# Patient Record
Sex: MALE | Race: WHITE | NOT HISPANIC OR LATINO | Employment: FULL TIME | URBAN - METROPOLITAN AREA
[De-identification: names, ages, dates, MRNs, and addresses within clinical notes are randomized per-mention and may not be internally consistent; named-entity substitution may affect disease eponyms.]

---

## 2019-12-05 ENCOUNTER — TRANSCRIBE ORDERS (OUTPATIENT)
Dept: ADMINISTRATIVE | Facility: HOSPITAL | Age: 34
End: 2019-12-05

## 2019-12-05 DIAGNOSIS — G54.0 THORACIC OUTLET SYNDROME: Primary | ICD-10-CM

## 2019-12-09 ENCOUNTER — HOSPITAL ENCOUNTER (OUTPATIENT)
Dept: CARDIOLOGY | Facility: HOSPITAL | Age: 34
Discharge: HOME | End: 2019-12-09
Admitting: SURGERY

## 2019-12-09 DIAGNOSIS — G54.0 THORACIC OUTLET SYNDROME: ICD-10-CM

## 2019-12-09 LAB
BH CV UPPER ARTERIAL LEFT 2ND DIGIT SYS MAX: 130 MMHG
BH CV UPPER ARTERIAL LEFT FBI 2ND DIGIT RATIO: 1.1
BH CV UPPER ARTERIAL RIGHT 2ND DIGIT SYS MAX: 132 MMHG
BH CV UPPER ARTERIAL RIGHT FBI 2ND DIGIT RATIO: 1.1
BH CV UPPER ARTERIAL RIGHT WBI RATIO: 1
UPPER ARTERIAL RIGHT ARM BRACHIAL SYS MAX: 117 MMHG
UPPER ARTERIAL RIGHT ARM RADIAL SYS MAX: 119 MMHG
UPPER ARTERIAL RIGHT ARM ULNAR SYS MAX: 114 MMHG

## 2019-12-09 PROCEDURE — 93923 UPR/LXTR ART STDY 3+ LVLS: CPT

## 2019-12-17 RX ORDER — PROPRANOLOL HYDROCHLORIDE 10 MG/1
10 TABLET ORAL DAILY PRN
COMMUNITY
Start: 2019-12-09

## 2019-12-17 RX ORDER — TRAZODONE HYDROCHLORIDE 50 MG/1
50 TABLET ORAL NIGHTLY PRN
COMMUNITY
Start: 2019-12-09 | End: 2020-12-08

## 2019-12-18 ENCOUNTER — HOSPITAL ENCOUNTER (OUTPATIENT)
Facility: HOSPITAL | Age: 34
Setting detail: HOSPITAL OUTPATIENT SURGERY
Discharge: HOME | End: 2019-12-18
Attending: SURGERY | Admitting: SURGERY

## 2019-12-18 ENCOUNTER — ANESTHESIA EVENT (OUTPATIENT)
Dept: PERIOP | Facility: HOSPITAL | Age: 34
End: 2019-12-18

## 2019-12-18 ENCOUNTER — ANESTHESIA (OUTPATIENT)
Dept: PERIOP | Facility: HOSPITAL | Age: 34
End: 2019-12-18

## 2019-12-18 ENCOUNTER — APPOINTMENT (OUTPATIENT)
Dept: GENERAL RADIOLOGY | Facility: HOSPITAL | Age: 34
End: 2019-12-18

## 2019-12-18 VITALS
DIASTOLIC BLOOD PRESSURE: 77 MMHG | HEIGHT: 76 IN | HEART RATE: 67 BPM | RESPIRATION RATE: 16 BRPM | WEIGHT: 183.86 LBS | SYSTOLIC BLOOD PRESSURE: 117 MMHG | TEMPERATURE: 97.8 F | BODY MASS INDEX: 22.39 KG/M2 | OXYGEN SATURATION: 99 %

## 2019-12-18 PROBLEM — I87.1 VENOUS THORACIC OUTLET SYNDROME OF LEFT SUBCLAVIAN VEIN: Status: ACTIVE | Noted: 2019-12-18

## 2019-12-18 PROCEDURE — C1725 CATH, TRANSLUMIN NON-LASER: HCPCS | Performed by: SURGERY

## 2019-12-18 PROCEDURE — C1887 CATHETER, GUIDING: HCPCS | Performed by: SURGERY

## 2019-12-18 PROCEDURE — C1894 INTRO/SHEATH, NON-LASER: HCPCS | Performed by: SURGERY

## 2019-12-18 PROCEDURE — 0 IOPAMIDOL PER 1 ML: Performed by: SURGERY

## 2019-12-18 PROCEDURE — C1769 GUIDE WIRE: HCPCS | Performed by: SURGERY

## 2019-12-18 PROCEDURE — 25010000002 ONDANSETRON PER 1 MG: Performed by: NURSE ANESTHETIST, CERTIFIED REGISTERED

## 2019-12-18 PROCEDURE — 25010000003 CEFAZOLIN IN DEXTROSE 2-4 GM/100ML-% SOLUTION: Performed by: SURGERY

## 2019-12-18 PROCEDURE — 75820 VEIN X-RAY ARM/LEG: CPT

## 2019-12-18 PROCEDURE — 25010000002 FENTANYL CITRATE (PF) 100 MCG/2ML SOLUTION: Performed by: NURSE ANESTHETIST, CERTIFIED REGISTERED

## 2019-12-18 PROCEDURE — 25010000002 ALTEPLASE 2 MG RECONSTITUTED SOLUTION 1 EACH VIAL: Performed by: SURGERY

## 2019-12-18 PROCEDURE — 25010000002 PROPOFOL 10 MG/ML EMULSION: Performed by: NURSE ANESTHETIST, CERTIFIED REGISTERED

## 2019-12-18 PROCEDURE — 25010000002 DEXAMETHASONE PER 1 MG: Performed by: NURSE ANESTHETIST, CERTIFIED REGISTERED

## 2019-12-18 PROCEDURE — 25010000003 LIDOCAINE 1 % SOLUTION 20 ML VIAL: Performed by: SURGERY

## 2019-12-18 PROCEDURE — C1757 CATH, THROMBECTOMY/EMBOLECT: HCPCS | Performed by: SURGERY

## 2019-12-18 PROCEDURE — 25010000002 HEPARIN (PORCINE) PER 1000 UNITS: Performed by: SURGERY

## 2019-12-18 PROCEDURE — 25010000002 MIDAZOLAM PER 1 MG: Performed by: ANESTHESIOLOGY

## 2019-12-18 RX ORDER — DIPHENHYDRAMINE HYDROCHLORIDE 50 MG/ML
12.5 INJECTION INTRAMUSCULAR; INTRAVENOUS
Status: DISCONTINUED | OUTPATIENT
Start: 2019-12-18 | End: 2019-12-18 | Stop reason: HOSPADM

## 2019-12-18 RX ORDER — ONDANSETRON 2 MG/ML
4 INJECTION INTRAMUSCULAR; INTRAVENOUS ONCE AS NEEDED
Status: DISCONTINUED | OUTPATIENT
Start: 2019-12-18 | End: 2019-12-18 | Stop reason: HOSPADM

## 2019-12-18 RX ORDER — FLUMAZENIL 0.1 MG/ML
0.2 INJECTION INTRAVENOUS AS NEEDED
Status: DISCONTINUED | OUTPATIENT
Start: 2019-12-18 | End: 2019-12-18 | Stop reason: HOSPADM

## 2019-12-18 RX ORDER — HYDRALAZINE HYDROCHLORIDE 20 MG/ML
5 INJECTION INTRAMUSCULAR; INTRAVENOUS
Status: DISCONTINUED | OUTPATIENT
Start: 2019-12-18 | End: 2019-12-18 | Stop reason: HOSPADM

## 2019-12-18 RX ORDER — PROMETHAZINE HYDROCHLORIDE 25 MG/ML
6.25 INJECTION, SOLUTION INTRAMUSCULAR; INTRAVENOUS
Status: DISCONTINUED | OUTPATIENT
Start: 2019-12-18 | End: 2019-12-18 | Stop reason: HOSPADM

## 2019-12-18 RX ORDER — CEFAZOLIN SODIUM 2 G/100ML
2 INJECTION, SOLUTION INTRAVENOUS ONCE
Status: COMPLETED | OUTPATIENT
Start: 2019-12-18 | End: 2019-12-18

## 2019-12-18 RX ORDER — DEXAMETHASONE SODIUM PHOSPHATE 10 MG/ML
INJECTION INTRAMUSCULAR; INTRAVENOUS AS NEEDED
Status: DISCONTINUED | OUTPATIENT
Start: 2019-12-18 | End: 2019-12-18 | Stop reason: SURG

## 2019-12-18 RX ORDER — HYDROCODONE BITARTRATE AND ACETAMINOPHEN 7.5; 325 MG/1; MG/1
1 TABLET ORAL ONCE AS NEEDED
Status: DISCONTINUED | OUTPATIENT
Start: 2019-12-18 | End: 2019-12-18 | Stop reason: HOSPADM

## 2019-12-18 RX ORDER — NALOXONE HCL 0.4 MG/ML
0.2 VIAL (ML) INJECTION AS NEEDED
Status: DISCONTINUED | OUTPATIENT
Start: 2019-12-18 | End: 2019-12-18 | Stop reason: HOSPADM

## 2019-12-18 RX ORDER — MIDAZOLAM HYDROCHLORIDE 1 MG/ML
2 INJECTION INTRAMUSCULAR; INTRAVENOUS
Status: DISCONTINUED | OUTPATIENT
Start: 2019-12-18 | End: 2019-12-18 | Stop reason: HOSPADM

## 2019-12-18 RX ORDER — PROMETHAZINE HYDROCHLORIDE 25 MG/ML
12.5 INJECTION, SOLUTION INTRAMUSCULAR; INTRAVENOUS ONCE AS NEEDED
Status: DISCONTINUED | OUTPATIENT
Start: 2019-12-18 | End: 2019-12-18 | Stop reason: HOSPADM

## 2019-12-18 RX ORDER — PROPOFOL 10 MG/ML
VIAL (ML) INTRAVENOUS AS NEEDED
Status: DISCONTINUED | OUTPATIENT
Start: 2019-12-18 | End: 2019-12-18 | Stop reason: SURG

## 2019-12-18 RX ORDER — ONDANSETRON 2 MG/ML
INJECTION INTRAMUSCULAR; INTRAVENOUS AS NEEDED
Status: DISCONTINUED | OUTPATIENT
Start: 2019-12-18 | End: 2019-12-18 | Stop reason: SURG

## 2019-12-18 RX ORDER — FENTANYL CITRATE 50 UG/ML
50 INJECTION, SOLUTION INTRAMUSCULAR; INTRAVENOUS
Status: DISCONTINUED | OUTPATIENT
Start: 2019-12-18 | End: 2019-12-18 | Stop reason: HOSPADM

## 2019-12-18 RX ORDER — LIDOCAINE HYDROCHLORIDE 20 MG/ML
INJECTION, SOLUTION INFILTRATION; PERINEURAL AS NEEDED
Status: DISCONTINUED | OUTPATIENT
Start: 2019-12-18 | End: 2019-12-18 | Stop reason: SURG

## 2019-12-18 RX ORDER — LABETALOL HYDROCHLORIDE 5 MG/ML
5 INJECTION, SOLUTION INTRAVENOUS
Status: DISCONTINUED | OUTPATIENT
Start: 2019-12-18 | End: 2019-12-18 | Stop reason: HOSPADM

## 2019-12-18 RX ORDER — MIDAZOLAM HYDROCHLORIDE 1 MG/ML
1 INJECTION INTRAMUSCULAR; INTRAVENOUS
Status: DISCONTINUED | OUTPATIENT
Start: 2019-12-18 | End: 2019-12-18 | Stop reason: HOSPADM

## 2019-12-18 RX ORDER — PROMETHAZINE HYDROCHLORIDE 25 MG/1
25 TABLET ORAL ONCE AS NEEDED
Status: DISCONTINUED | OUTPATIENT
Start: 2019-12-18 | End: 2019-12-18 | Stop reason: HOSPADM

## 2019-12-18 RX ORDER — EPHEDRINE SULFATE 50 MG/ML
5 INJECTION, SOLUTION INTRAVENOUS ONCE AS NEEDED
Status: DISCONTINUED | OUTPATIENT
Start: 2019-12-18 | End: 2019-12-18 | Stop reason: HOSPADM

## 2019-12-18 RX ORDER — HYDROMORPHONE HYDROCHLORIDE 1 MG/ML
0.5 INJECTION, SOLUTION INTRAMUSCULAR; INTRAVENOUS; SUBCUTANEOUS
Status: DISCONTINUED | OUTPATIENT
Start: 2019-12-18 | End: 2019-12-18 | Stop reason: HOSPADM

## 2019-12-18 RX ORDER — SODIUM CHLORIDE 0.9 % (FLUSH) 0.9 %
3-10 SYRINGE (ML) INJECTION AS NEEDED
Status: DISCONTINUED | OUTPATIENT
Start: 2019-12-18 | End: 2019-12-18 | Stop reason: HOSPADM

## 2019-12-18 RX ORDER — PROMETHAZINE HYDROCHLORIDE 25 MG/1
25 SUPPOSITORY RECTAL ONCE AS NEEDED
Status: DISCONTINUED | OUTPATIENT
Start: 2019-12-18 | End: 2019-12-18 | Stop reason: HOSPADM

## 2019-12-18 RX ORDER — ACETAMINOPHEN 325 MG/1
650 TABLET ORAL ONCE AS NEEDED
Status: DISCONTINUED | OUTPATIENT
Start: 2019-12-18 | End: 2019-12-18 | Stop reason: HOSPADM

## 2019-12-18 RX ORDER — OXYCODONE AND ACETAMINOPHEN 7.5; 325 MG/1; MG/1
1 TABLET ORAL ONCE AS NEEDED
Status: DISCONTINUED | OUTPATIENT
Start: 2019-12-18 | End: 2019-12-18 | Stop reason: HOSPADM

## 2019-12-18 RX ORDER — SODIUM CHLORIDE 0.9 % (FLUSH) 0.9 %
3 SYRINGE (ML) INJECTION EVERY 12 HOURS SCHEDULED
Status: DISCONTINUED | OUTPATIENT
Start: 2019-12-18 | End: 2019-12-18 | Stop reason: HOSPADM

## 2019-12-18 RX ORDER — FAMOTIDINE 10 MG/ML
20 INJECTION, SOLUTION INTRAVENOUS ONCE
Status: COMPLETED | OUTPATIENT
Start: 2019-12-18 | End: 2019-12-18

## 2019-12-18 RX ORDER — SODIUM CHLORIDE, SODIUM LACTATE, POTASSIUM CHLORIDE, CALCIUM CHLORIDE 600; 310; 30; 20 MG/100ML; MG/100ML; MG/100ML; MG/100ML
9 INJECTION, SOLUTION INTRAVENOUS CONTINUOUS
Status: DISCONTINUED | OUTPATIENT
Start: 2019-12-18 | End: 2019-12-18 | Stop reason: HOSPADM

## 2019-12-18 RX ORDER — DIPHENHYDRAMINE HCL 25 MG
25 CAPSULE ORAL
Status: DISCONTINUED | OUTPATIENT
Start: 2019-12-18 | End: 2019-12-18 | Stop reason: HOSPADM

## 2019-12-18 RX ORDER — FENTANYL CITRATE 50 UG/ML
INJECTION, SOLUTION INTRAMUSCULAR; INTRAVENOUS AS NEEDED
Status: DISCONTINUED | OUTPATIENT
Start: 2019-12-18 | End: 2019-12-18 | Stop reason: SURG

## 2019-12-18 RX ADMIN — FENTANYL CITRATE 50 MCG: 50 INJECTION INTRAMUSCULAR; INTRAVENOUS at 12:37

## 2019-12-18 RX ADMIN — PROPOFOL 150 MCG/KG/MIN: 10 INJECTION, EMULSION INTRAVENOUS at 12:37

## 2019-12-18 RX ADMIN — SODIUM CHLORIDE, POTASSIUM CHLORIDE, SODIUM LACTATE AND CALCIUM CHLORIDE 9 ML/HR: 600; 310; 30; 20 INJECTION, SOLUTION INTRAVENOUS at 11:54

## 2019-12-18 RX ADMIN — IOPAMIDOL 30 ML: 510 INJECTION, SOLUTION INTRAVASCULAR at 12:32

## 2019-12-18 RX ADMIN — FAMOTIDINE 20 MG: 10 INJECTION INTRAVENOUS at 11:54

## 2019-12-18 RX ADMIN — CEFAZOLIN SODIUM 2 G: 2 INJECTION, SOLUTION INTRAVENOUS at 12:33

## 2019-12-18 RX ADMIN — LIDOCAINE HYDROCHLORIDE 80 MG: 20 INJECTION, SOLUTION INFILTRATION; PERINEURAL at 12:33

## 2019-12-18 RX ADMIN — MIDAZOLAM 2 MG: 1 INJECTION INTRAMUSCULAR; INTRAVENOUS at 11:54

## 2019-12-18 RX ADMIN — PROPOFOL 50 MG: 10 INJECTION, EMULSION INTRAVENOUS at 12:37

## 2019-12-18 RX ADMIN — DEXAMETHASONE SODIUM PHOSPHATE 8 MG: 10 INJECTION INTRAMUSCULAR; INTRAVENOUS at 12:43

## 2019-12-18 RX ADMIN — ONDANSETRON HYDROCHLORIDE 4 MG: 2 SOLUTION INTRAMUSCULAR; INTRAVENOUS at 12:43

## 2019-12-18 RX ADMIN — PROPOFOL 50 MG: 10 INJECTION, EMULSION INTRAVENOUS at 12:34

## 2019-12-18 RX ADMIN — FENTANYL CITRATE 50 MCG: 50 INJECTION INTRAMUSCULAR; INTRAVENOUS at 12:30

## 2019-12-18 NOTE — PERIOPERATIVE NURSING NOTE
Belongings placed in lower cabinet in back nurses station in preop.  Locked and logged on the steno book.

## 2019-12-18 NOTE — ANESTHESIA POSTPROCEDURE EVALUATION
"Patient: Rad Baltazar    Procedure Summary     Date:  12/18/19 Room / Location:   [facility] INV /  [facility] 18/19    Anesthesia Start:  1228 Anesthesia Stop:  1340    Procedure:  LEFT ARM VENOGRAM AND THROMOLYSIS (Left ) Diagnosis:      Surgeon:  Dejuan White MD Provider:  Braulio Andrade MD    Anesthesia Type:  MAC ASA Status:  2          Anesthesia Type: MAC    Vitals  Vitals Value Taken Time   /83 12/18/2019  2:25 PM   Temp 36.6 °C (97.8 °F) 12/18/2019  1:37 PM   Pulse 78 12/18/2019  2:10 PM   Resp 16 12/18/2019  2:25 PM   SpO2 98 % 12/18/2019  2:37 PM   Vitals shown include unvalidated device data.        Post Anesthesia Care and Evaluation    Patient location during evaluation: bedside  Patient participation: complete - patient participated  Level of consciousness: awake  Pain management: adequate  Airway patency: patent  Anesthetic complications: No anesthetic complications    Cardiovascular status: acceptable  Respiratory status: acceptable  Hydration status: acceptable    Comments: */83   Pulse 78   Temp 36.6 °C (97.8 °F) (Oral)   Resp 16   Ht 193 cm (76\")   Wt 83.4 kg (183 lb 13.8 oz)   SpO2 98%   BMI 22.38 kg/m²         "

## 2019-12-18 NOTE — DISCHARGE INSTRUCTIONS
NO STICKS IN THE LEFT ARM, BUT A BLOOD PRESSURE IS OK          Moderate Conscious Sedation, Adult, Care After  Refer to this sheet in the next few weeks. These instructions provide you with information on caring for yourself after your procedure. Your health care provider may also give you more specific instructions. Your treatment has been planned according to current medical practices, but problems sometimes occur. Call your health care provider if you have any problems or questions after your procedure.  WHAT TO EXPECT AFTER THE PROCEDURE   After your procedure:  · You may feel sleepy, clumsy, and have poor balance for several hours.  · Vomiting may occur if you eat too soon after the procedure.  HOME CARE INSTRUCTIONS  · Do not participate in any activities where you could become injured for at least 24 hours. Do not:    Drive.    Swim.    Ride a bicycle.    Operate heavy machinery.    Cook.    Use power tools.    Climb ladders.    Work from a high place.  · Do not make important decisions or sign legal documents until you are improved.  · If you vomit, drink water, juice, or soup when you can drink without vomiting. Make sure you have little or no nausea before eating solid foods.  · Only take over-the-counter or prescription medicines for pain, discomfort, or fever as directed by your health care provider.  · Make sure you and your family fully understand everything about the medicines given to you, including what side effects may occur.  · You should not drink alcohol, take sleeping pills, or take medicines that cause drowsiness for at least 24 hours.  · If you smoke, do not smoke without supervision.  · If you are feeling better, you may resume normal activities 24 hours after you were sedated.  · Keep all appointments with your health care provider.  · You should have a responsible adult stay with you for the first 24 hours post procedure.  SEEK MEDICAL CARE IF:  · Your skin is pale or bluish in  color.  · You continue to feel nauseous or vomit.  · Your pain is getting worse and is not helped by medicine.  · You have bleeding or swelling.  · You are still sleepy or feeling clumsy after 24 hours.  SEEK IMMEDIATE MEDICAL CARE IF:  · You develop a rash.  · You have difficulty breathing.  · You develop any type of allergic problem.  · You have a fever.  MAKE SURE YOU:  · Understand these instructions.  · Will watch your condition.  · Will get help right away if you are not doing well or get worse.     This information is not intended to replace advice given to you by your health care provider. Make sure you discuss any questions you have with your health care provider.     Document Released: 10/08/2014 Document Revised: 01/08/2016 Document Reviewed: 10/08/2014  Elsevier Interactive Patient Education ©2016 [employer]

## 2019-12-18 NOTE — OP NOTE
[facility]  Rad Baltazar  1985     Brief Operative Note    12/18/19   Dejuan White MD      Preoperative Diagnosis: Paget Taylor syndrome, left arm    Postoperative Diagnosis: same as above    Procedure Performed: #1 ultrasound-guided access left basilic vein, #2 left arm venogram with angioplasty.  #3 thrombolysis of left subclavian vein    Surgeon: Dejuan White MD    Assistant: Alexa Gotti and they provided critical assistance during the case including suctioning, exposure, retraction, and reduction of blood loss.    Anesthesia: MAC and Local Anesthesia with 1% Xylocaine with Epinephrine 1:100,000    Estimated Blood Loss: minimal    Specimen: None    Complications: None    Dispo: aroused from sedation, and taken to the recovery room in a stable condition    Indication for procedure: 34 y.o. male with subacute thrombus of the left axilla subclavian vein.  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  Chronic thrombus in the axillary vein.  Reconstitution of small flow channel seen after thrombolysis and balloon angioplasty    Description of procedure: The patient was taken to the operating room and placed in the supine position.  The left arm was prepped and draped in usual sterile fashion.  A timeout was observed.  Preoperative antibiotics were given.  Using ultrasound guidance and local anesthesia the left basilic vein was accessed in a standard fashion and a micropuncture sheath was placed.  This was upsized to a 6 Mauritian sheath.  A left arm venogram was performed.  With some difficulty, the wire was manipulated into the superior vena cava.  This required abduction of the patient's arm to facilitate wire placement.  A as a line today AngioJet was placed and TPA was instilled into the axillary and subclavian clot and allowed to dwell for 10 minutes.  Then, the AngioJet was  used for 110 pulses to perform a thrombectomy.  Repeat angiography showed no significant change.  The thoracic outlet was balloon angioplastied with a 6 mm x 8 cm Ripton balloon.  Significant waist was seen at the thoracic outlet.  This was resolved.  Further venograms revealed a small channel at the thoracic outlet.  No further angioplasty was done.  The sheaths, wire, catheter were discontinued.  The patient tolerated the procedure well.                      Dejuan White MD  12/18/19

## 2019-12-18 NOTE — H&P
Name: Rad Baltazar ADMIT: 2019   : 1985  PCP: Dar Garcia MD    MRN: [id number] LOS: 0 days   AGE/SEX: 34 y.o. male  ROOM: [facility] OR  [facility]         CHIEF COMPLAINT: Left arm swelling  HPI: Rad Baltazar is a 34 y.o. male whose previous medical records I have reviewed and summarize below in addition to the findings from today's exam.  He has a left axilla subclavian DVT times about 6 weeks.  This began without inciting event.  He was initially treated with aspirin but is now on Eliquis.  He is here today for left arm venogram and possible thrombolysis of subclavian   Vein thrombus  Continues to notice left arm swelling and collateral vein formation  PAST MEDICAL HISTORY:   Past Medical History:   Diagnosis Date   • DVT (deep venous thrombosis) (CMS/HCC)     LEFT ARM   • Thoracic outlet syndrome       PAST SURGICAL HISTORY:   Past Surgical History:   Procedure Laterality Date   • WISDOM TOOTH EXTRACTION        FAMILY HISTORY:   Family History   Problem Relation Age of Onset   • Malig Hyperthermia Neg Hx       SOCIAL HISTORY:   Social History     Tobacco Use   • Smoking status: Current Every Day Smoker   • Smokeless tobacco: Never Used   • Tobacco comment: 8 CIGS A DAY   Substance Use Topics   • Alcohol use: Never     Frequency: Never   • Drug use: Never      MEDICATIONS:   No current facility-administered medications on file prior to encounter.      Current Outpatient Medications on File Prior to Encounter   Medication Sig Dispense Refill   • apixaban (ELIQUIS) 5 MG tablet tablet Take 5 mg by mouth 2 (Two) Times a Day.     • Multiple Vitamins-Minerals (MULTIVITAMIN ADULT PO) Take 1 tablet by mouth Daily.     • propranolol (INDERAL) 10 MG tablet Take 10 mg by mouth Daily.     • traZODone (DESYREL) 50 MG tablet Take 50 mg by mouth As Needed.               ALLERGIES: Patient has no known allergies.     COMPLETE REVIEW OF SYSTEMS:     Review of Systems   Constitutional:  "Positive for activity change and fatigue.   Respiratory: Negative for shortness of breath.    Cardiovascular: Negative for chest pain.   Gastrointestinal: Negative for abdominal pain.         PHYSICAL EXAM:   Patient Vitals for the past 24 hrs:   BP Temp Temp src Pulse Resp SpO2 Height Weight   12/18/19 1121 129/80 97.8 °F (36.6 °C) Oral 77 18 100 % 193 cm (76\") 83.4 kg (183 lb 13.8 oz)        Physical Exam   Constitutional: He appears well-developed. No distress.   HENT:   Head: Normocephalic and atraumatic.   Cardiovascular: Normal rate and regular rhythm.   Pulmonary/Chest: Effort normal. No respiratory distress.   Abdominal: Soft. There is no tenderness.   Neurological: He is alert.   Skin: Skin is warm and dry.     Significant left upper extremity swelling without cyanosis      LABS:      No results found for this or any previous visit (from the past 24 hour(s)).]    The following radiologic or non-invasive studies including the images have been independently reviewed by me and my impressions are as follows: Ultrasound with axilla subclavian DVT on the left       ASSESSMENT/PLAN: 34 y.o. male with probable Paget Taylor syndrome with the left axilla subclavian DVT in the setting of healthy muscular young male.  He is here today for thrombolyzes to establish a channel to allow for drainage.  We discussed that he needs to abstain from vigorous upper extremity exercise to allow his vein the greatest opportunity to recanalize without re-traumatizing it.  We discussed that he may eventually require rib resection and scalene ectomy.    I have discussed with the patient the following five points:  1-  I have been asked to see Rad Baltazar for the treatment of the diagnosis of: Left arm DVT with swelling  2- The planned treatment of this diagnosis is: Thrombolysis, venogram  3- The risks of a procedure include but are not limited to the following: bleeding, thrombosis, damage to adjacent anatomical structures " including nerves or blood vessels, infections, wound healing problems, the need for further procedures, whether planned or emergent. The benefits of a procedure include but are not limited to the following: Restoration of normal circulation  4- Alternatives to the planned procedure include: Anticoagulation alone  5- The natural history of the diagnosis if no treatment is given is: Continued swelling    Assessment/Plan       * No active hospital problems. *      Dejuan White MD   12/18/19

## 2020-02-10 ENCOUNTER — APPOINTMENT (OUTPATIENT)
Dept: PREADMISSION TESTING | Facility: HOSPITAL | Age: 35
End: 2020-02-10

## 2020-02-10 VITALS
RESPIRATION RATE: 16 BRPM | DIASTOLIC BLOOD PRESSURE: 77 MMHG | BODY MASS INDEX: 22.1 KG/M2 | HEART RATE: 63 BPM | TEMPERATURE: 98.6 F | SYSTOLIC BLOOD PRESSURE: 123 MMHG | OXYGEN SATURATION: 100 % | HEIGHT: 76 IN | WEIGHT: 181.5 LBS

## 2020-02-10 LAB
ANION GAP SERPL CALCULATED.3IONS-SCNC: 13.4 MMOL/L (ref 5–15)
BUN BLD-MCNC: 23 MG/DL (ref 6–20)
BUN/CREAT SERPL: 21.9 (ref 7–25)
CALCIUM SPEC-SCNC: 9.1 MG/DL (ref 8.6–10.5)
CHLORIDE SERPL-SCNC: 98 MMOL/L (ref 98–107)
CO2 SERPL-SCNC: 27.6 MMOL/L (ref 22–29)
CREAT BLD-MCNC: 1.05 MG/DL (ref 0.76–1.27)
DEPRECATED RDW RBC AUTO: 42.3 FL (ref 37–54)
ERYTHROCYTE [DISTWIDTH] IN BLOOD BY AUTOMATED COUNT: 12.5 % (ref 12.3–15.4)
GFR SERPL CREATININE-BSD FRML MDRD: 81 ML/MIN/1.73
GLUCOSE BLD-MCNC: 96 MG/DL (ref 65–99)
HCT VFR BLD AUTO: 46.1 % (ref 37.5–51)
HGB BLD-MCNC: 16 G/DL (ref 13–17.7)
MCH RBC QN AUTO: 32 PG (ref 26.6–33)
MCHC RBC AUTO-ENTMCNC: 34.7 G/DL (ref 31.5–35.7)
MCV RBC AUTO: 92.2 FL (ref 79–97)
PLATELET # BLD AUTO: 177 10*3/MM3 (ref 140–450)
PMV BLD AUTO: 11 FL (ref 6–12)
POTASSIUM BLD-SCNC: 3.9 MMOL/L (ref 3.5–5.2)
RBC # BLD AUTO: 5 10*6/MM3 (ref 4.14–5.8)
SODIUM BLD-SCNC: 139 MMOL/L (ref 136–145)
WBC NRBC COR # BLD: 8.57 10*3/MM3 (ref 3.4–10.8)

## 2020-02-10 PROCEDURE — 80048 BASIC METABOLIC PNL TOTAL CA: CPT | Performed by: SURGERY

## 2020-02-10 PROCEDURE — 85027 COMPLETE CBC AUTOMATED: CPT | Performed by: SURGERY

## 2020-02-10 PROCEDURE — 36415 COLL VENOUS BLD VENIPUNCTURE: CPT

## 2020-02-10 NOTE — DISCHARGE INSTRUCTIONS
Take the following medications the morning of surgery:  PROPRANOLOL    PLEASE ARRIVE AT THE HOSPITAL AT 7:30 AM ON February 17, 2020    General Instructions:  • Do not eat solid food after midnight the night before surgery.  • You may drink clear liquids day of surgery but must stop at least one hour before your hospital arrival time.  • NOTHING TO DRINK AFTER 6:30 AM  • It is beneficial for you to have a clear drink that contains carbohydrates the day of surgery.  We suggest a 12 to 20 ounce bottle of Gatorade or Powerade for non-diabetic patients or a 12 to 20 ounce bottle of G2 or Powerade Zero for diabetic patients. (Pediatric patients, are not advised to drink a 12 to 20 ounce carbohydrate drink)    Clear liquids are liquids you can see through.  Nothing red in color.     Plain water                               Sports drinks  Sodas                                   Gelatin (Jell-O)  Fruit juices without pulp such as white grape juice and apple juice  Popsicles that contain no fruit or yogurt  Tea or coffee (no cream or milk added)  Gatorade / Powerade  G2 / Powerade Zero    • Infants may have breast milk up to four hours before surgery.  • Infants drinking formula may drink formula up to six hours before surgery.   • Patients who avoid smoking, chewing tobacco and alcohol for 4 weeks prior to surgery have a reduced risk of post-operative complications.  Quit smoking as many days before surgery as you can.  • Do not smoke, use chewing tobacco or drink alcohol the day of surgery.   • If applicable bring your C-PAP/ BI-PAP machine.  • Bring any papers given to you in the doctor’s office.  • Wear clean comfortable clothes.  • Do not wear contact lenses, false eyelashes or make-up.  Bring a case for your glasses.   • Bring crutches or walker if applicable.  • Remove all piercings.  Leave jewelry and any other valuables at home.  • Hair extensions with metal clips must be removed prior to surgery.  • The  Pre-Admission Testing nurse will instruct you to bring medications if unable to obtain an accurate list in Pre-Admission Testing.      Preventing a Surgical Site Infection:  • For 2 to 3 days before surgery, avoid shaving with a razor because the razor can irritate skin and make it easier to develop an infection.    • Any areas of open skin can increase the risk of a post-operative wound infection by allowing bacteria to enter and travel throughout the body.  Notify your surgeon if you have any skin wounds / rashes even if it is not near the expected surgical site.  The area will need assessed to determine if surgery should be delayed until it is healed.  • The night prior to surgery sleep in a clean bed with clean clothing.  Do not allow pets to sleep with you.  • Shower on the morning of surgery using a fresh bar of anti-bacterial soap (such as Dial) and clean washcloth.  Dry with a clean towel and dress in clean clothing.  • Ask your surgeon if you will be receiving antibiotics prior to surgery.  • Make sure you, your family, and all healthcare providers clean their hands with soap and water or an alcohol based hand  before caring for you or your wound.    2% CHLORHEXIDINE GLUCONATE* CLOTH  Preparing or “prepping” skin before surgery can reduce the risk of infection at the surgical site. To make the process easier, [facility] has chosen disposable cloths moistened with a rinse-free, 2% Chlorhexidine Gluconate (CHG) antiseptic solution. The steps below outline the prepping process and should be carefully followed.        Use the prep cloth on the area that is circled in the diagram             Directions Night before Surgery  1) Shower using a fresh bar of anti-bacterial soap (such as Dial) and clean washcloth.  Use a clean towel to completely dry your skin.  2) Do not use any lotions, oils or creams on your skin.  3) Open the package and remove 1 cloth, wipe your skin for 30 seconds in a  circular motion.  Allow to dry for 3 minutes.  4) Repeat #3 with second cloth.  5) Do not touch your eyes, ears, or mouth with the prep cloth.  6) Allow the wet prep solution to air dry.  7) Discard the prep cloth and wash your hands with soap and water.   8) Dress in clean bed clothes and sleep on fresh clean bed sheets.   9) You may experience some temporary itching after the prep.    Directions Day of Surgery  1) Repeat steps 1,2,3,4,5,6,7, and 9.   2) Dress in clean clothes before coming to the hospital.    Day of surgery:  Your arrival time is approximately two hours before your scheduled surgery time.  Upon arrival, a Pre-op nurse and Anesthesiologist will review your health history, obtain vital signs, and answer questions you may have.  The only belongings needed at this time will be a list of your home medications and if applicable your C-PAP/BI-PAP machine.  If you are staying overnight your family can leave the rest of your belongings in the car and bring them to your room later.  A Pre-op nurse will start an IV and you may receive medication in preparation for surgery, including something to help you relax.  Your family will be able to see you in the Pre-op area.  Two visitors at a time will be allowed in the Pre-op room.  While you are in surgery your family should notify the waiting room  if they leave the waiting room area and provide a contact phone number.    Please be aware that surgery does come with discomfort.  We want to make every effort to control your discomfort so please discuss any uncontrolled symptoms with your nurse.   Your doctor will most likely have prescribed pain medications.      If you are going home after surgery you will receive individualized written care instructions before being discharged.  A responsible adult must drive you to and from the hospital on the day of your surgery and stay with you for 24 hours.    If you are staying overnight following surgery, you  will be transported to your hospital room following the recovery period.  [facility] has all private rooms.    If you have any questions please call Pre-Admission Testing at [phone].  Deductibles and co-payments are collected on the day of service. Please be prepared to pay the required co-pay, deductible or deposit on the day of service as defined by your plan.

## 2020-02-17 ENCOUNTER — ANESTHESIA (OUTPATIENT)
Dept: PERIOP | Facility: HOSPITAL | Age: 35
End: 2020-02-17

## 2020-02-17 ENCOUNTER — HOSPITAL ENCOUNTER (INPATIENT)
Facility: HOSPITAL | Age: 35
LOS: 3 days | Discharge: HOME | End: 2020-02-20
Attending: SURGERY | Admitting: SURGERY

## 2020-02-17 ENCOUNTER — APPOINTMENT (OUTPATIENT)
Dept: GENERAL RADIOLOGY | Facility: HOSPITAL | Age: 35
End: 2020-02-17

## 2020-02-17 ENCOUNTER — ANESTHESIA EVENT (OUTPATIENT)
Dept: PERIOP | Facility: HOSPITAL | Age: 35
End: 2020-02-17

## 2020-02-17 DIAGNOSIS — I87.1 VENOUS THORACIC OUTLET SYNDROME OF LEFT SUBCLAVIAN VEIN: ICD-10-CM

## 2020-02-17 PROCEDURE — 25010000002 ONDANSETRON PER 1 MG: Performed by: NURSE ANESTHETIST, CERTIFIED REGISTERED

## 2020-02-17 PROCEDURE — 93010 ELECTROCARDIOGRAM REPORT: CPT | Performed by: INTERNAL MEDICINE

## 2020-02-17 PROCEDURE — 25010000002 PROPOFOL 10 MG/ML EMULSION: Performed by: NURSE ANESTHETIST, CERTIFIED REGISTERED

## 2020-02-17 PROCEDURE — 71046 X-RAY EXAM CHEST 2 VIEWS: CPT

## 2020-02-17 PROCEDURE — 71045 X-RAY EXAM CHEST 1 VIEW: CPT

## 2020-02-17 PROCEDURE — 25010000002 MIDAZOLAM PER 1 MG: Performed by: ANESTHESIOLOGY

## 2020-02-17 PROCEDURE — 0KB30ZZ EXCISION OF LEFT NECK MUSCLE, OPEN APPROACH: ICD-10-PCS | Performed by: SURGERY

## 2020-02-17 PROCEDURE — 25010000002 FENTANYL CITRATE (PF) 100 MCG/2ML SOLUTION: Performed by: NURSE ANESTHETIST, CERTIFIED REGISTERED

## 2020-02-17 PROCEDURE — 25010000003 HYDROMORPHONE HCL PF 50 MG/5ML SOLUTION: Performed by: SURGERY

## 2020-02-17 PROCEDURE — 93005 ELECTROCARDIOGRAM TRACING: CPT | Performed by: SURGERY

## 2020-02-17 PROCEDURE — 25010000003 CEFAZOLIN IN DEXTROSE 2-4 GM/100ML-% SOLUTION: Performed by: SURGERY

## 2020-02-17 PROCEDURE — 88300 SURGICAL PATH GROSS: CPT | Performed by: SURGERY

## 2020-02-17 PROCEDURE — 0PT10ZZ RESECTION OF 1 TO 2 RIBS, OPEN APPROACH: ICD-10-PCS | Performed by: SURGERY

## 2020-02-17 PROCEDURE — 25010000002 NEOSTIGMINE PER 0.5 MG: Performed by: NURSE ANESTHETIST, CERTIFIED REGISTERED

## 2020-02-17 PROCEDURE — 25010000002 HYDROMORPHONE PER 4 MG: Performed by: NURSE ANESTHETIST, CERTIFIED REGISTERED

## 2020-02-17 PROCEDURE — 25010000002 DEXAMETHASONE PER 1 MG: Performed by: NURSE ANESTHETIST, CERTIFIED REGISTERED

## 2020-02-17 RX ORDER — NALOXONE HCL 0.4 MG/ML
0.2 VIAL (ML) INJECTION AS NEEDED
Status: DISCONTINUED | OUTPATIENT
Start: 2020-02-17 | End: 2020-02-17 | Stop reason: HOSPADM

## 2020-02-17 RX ORDER — FENTANYL CITRATE 50 UG/ML
50 INJECTION, SOLUTION INTRAMUSCULAR; INTRAVENOUS
Status: DISCONTINUED | OUTPATIENT
Start: 2020-02-17 | End: 2020-02-17 | Stop reason: HOSPADM

## 2020-02-17 RX ORDER — EPHEDRINE SULFATE 50 MG/ML
INJECTION, SOLUTION INTRAVENOUS AS NEEDED
Status: DISCONTINUED | OUTPATIENT
Start: 2020-02-17 | End: 2020-02-17 | Stop reason: SURG

## 2020-02-17 RX ORDER — FENTANYL CITRATE 50 UG/ML
INJECTION, SOLUTION INTRAMUSCULAR; INTRAVENOUS AS NEEDED
Status: DISCONTINUED | OUTPATIENT
Start: 2020-02-17 | End: 2020-02-17 | Stop reason: SURG

## 2020-02-17 RX ORDER — ONDANSETRON 2 MG/ML
INJECTION INTRAMUSCULAR; INTRAVENOUS AS NEEDED
Status: DISCONTINUED | OUTPATIENT
Start: 2020-02-17 | End: 2020-02-17 | Stop reason: SURG

## 2020-02-17 RX ORDER — HYDROMORPHONE HYDROCHLORIDE 1 MG/ML
0.5 INJECTION, SOLUTION INTRAMUSCULAR; INTRAVENOUS; SUBCUTANEOUS
Status: DISCONTINUED | OUTPATIENT
Start: 2020-02-17 | End: 2020-02-17 | Stop reason: HOSPADM

## 2020-02-17 RX ORDER — ACETAMINOPHEN 325 MG/1
650 TABLET ORAL EVERY 4 HOURS PRN
Status: DISCONTINUED | OUTPATIENT
Start: 2020-02-17 | End: 2020-02-20 | Stop reason: HOSPADM

## 2020-02-17 RX ORDER — CYCLOBENZAPRINE HCL 10 MG
10 TABLET ORAL 3 TIMES DAILY
Status: DISCONTINUED | OUTPATIENT
Start: 2020-02-17 | End: 2020-02-20 | Stop reason: HOSPADM

## 2020-02-17 RX ORDER — NITROGLYCERIN 0.4 MG/1
0.4 TABLET SUBLINGUAL
Status: DISCONTINUED | OUTPATIENT
Start: 2020-02-17 | End: 2020-02-20 | Stop reason: HOSPADM

## 2020-02-17 RX ORDER — MAGNESIUM HYDROXIDE 1200 MG/15ML
LIQUID ORAL AS NEEDED
Status: DISCONTINUED | OUTPATIENT
Start: 2020-02-17 | End: 2020-02-17 | Stop reason: HOSPADM

## 2020-02-17 RX ORDER — OXYCODONE AND ACETAMINOPHEN 7.5; 325 MG/1; MG/1
1 TABLET ORAL ONCE AS NEEDED
Status: COMPLETED | OUTPATIENT
Start: 2020-02-17 | End: 2020-02-17

## 2020-02-17 RX ORDER — MIDAZOLAM HYDROCHLORIDE 1 MG/ML
1 INJECTION INTRAMUSCULAR; INTRAVENOUS
Status: DISCONTINUED | OUTPATIENT
Start: 2020-02-17 | End: 2020-02-17 | Stop reason: HOSPADM

## 2020-02-17 RX ORDER — PROMETHAZINE HYDROCHLORIDE 25 MG/1
25 TABLET ORAL ONCE AS NEEDED
Status: DISCONTINUED | OUTPATIENT
Start: 2020-02-17 | End: 2020-02-17 | Stop reason: HOSPADM

## 2020-02-17 RX ORDER — IBUPROFEN 800 MG/1
800 TABLET ORAL EVERY 8 HOURS SCHEDULED
Status: DISCONTINUED | OUTPATIENT
Start: 2020-02-17 | End: 2020-02-20 | Stop reason: HOSPADM

## 2020-02-17 RX ORDER — HYDROMORPHONE HCL IN 0.9% NACL 10 MG/50ML
PATIENT CONTROLLED ANALGESIA SYRINGE INTRAVENOUS CONTINUOUS
Status: DISCONTINUED | OUTPATIENT
Start: 2020-02-17 | End: 2020-02-18

## 2020-02-17 RX ORDER — PROPRANOLOL HYDROCHLORIDE 10 MG/1
10 TABLET ORAL DAILY
Status: DISCONTINUED | OUTPATIENT
Start: 2020-02-18 | End: 2020-02-20 | Stop reason: HOSPADM

## 2020-02-17 RX ORDER — CEFAZOLIN SODIUM 2 G/100ML
2 INJECTION, SOLUTION INTRAVENOUS ONCE
Status: COMPLETED | OUTPATIENT
Start: 2020-02-17 | End: 2020-02-17

## 2020-02-17 RX ORDER — LABETALOL HYDROCHLORIDE 5 MG/ML
5 INJECTION, SOLUTION INTRAVENOUS
Status: DISCONTINUED | OUTPATIENT
Start: 2020-02-17 | End: 2020-02-17 | Stop reason: HOSPADM

## 2020-02-17 RX ORDER — FLUMAZENIL 0.1 MG/ML
0.2 INJECTION INTRAVENOUS AS NEEDED
Status: DISCONTINUED | OUTPATIENT
Start: 2020-02-17 | End: 2020-02-17 | Stop reason: HOSPADM

## 2020-02-17 RX ORDER — SODIUM CHLORIDE 0.9 % (FLUSH) 0.9 %
3 SYRINGE (ML) INJECTION EVERY 12 HOURS SCHEDULED
Status: DISCONTINUED | OUTPATIENT
Start: 2020-02-17 | End: 2020-02-17 | Stop reason: HOSPADM

## 2020-02-17 RX ORDER — ACETAMINOPHEN 325 MG/1
650 TABLET ORAL ONCE AS NEEDED
Status: DISCONTINUED | OUTPATIENT
Start: 2020-02-17 | End: 2020-02-17 | Stop reason: HOSPADM

## 2020-02-17 RX ORDER — HYDROCODONE BITARTRATE AND ACETAMINOPHEN 7.5; 325 MG/1; MG/1
1 TABLET ORAL ONCE AS NEEDED
Status: DISCONTINUED | OUTPATIENT
Start: 2020-02-17 | End: 2020-02-17 | Stop reason: HOSPADM

## 2020-02-17 RX ORDER — CEFAZOLIN SODIUM 2 G/100ML
2 INJECTION, SOLUTION INTRAVENOUS EVERY 8 HOURS
Status: COMPLETED | OUTPATIENT
Start: 2020-02-17 | End: 2020-02-18

## 2020-02-17 RX ORDER — HYDRALAZINE HYDROCHLORIDE 20 MG/ML
5 INJECTION INTRAMUSCULAR; INTRAVENOUS
Status: DISCONTINUED | OUTPATIENT
Start: 2020-02-17 | End: 2020-02-17 | Stop reason: HOSPADM

## 2020-02-17 RX ORDER — LIDOCAINE HYDROCHLORIDE 20 MG/ML
INJECTION, SOLUTION INFILTRATION; PERINEURAL AS NEEDED
Status: DISCONTINUED | OUTPATIENT
Start: 2020-02-17 | End: 2020-02-17 | Stop reason: SURG

## 2020-02-17 RX ORDER — ACETAMINOPHEN 325 MG/1
650 TABLET ORAL EVERY 6 HOURS PRN
Status: DISCONTINUED | OUTPATIENT
Start: 2020-02-17 | End: 2020-02-20 | Stop reason: HOSPADM

## 2020-02-17 RX ORDER — PROMETHAZINE HYDROCHLORIDE 25 MG/ML
12.5 INJECTION, SOLUTION INTRAMUSCULAR; INTRAVENOUS ONCE AS NEEDED
Status: DISCONTINUED | OUTPATIENT
Start: 2020-02-17 | End: 2020-02-17 | Stop reason: HOSPADM

## 2020-02-17 RX ORDER — CHOLECALCIFEROL (VITAMIN D3) 125 MCG
7.5 CAPSULE ORAL NIGHTLY PRN
COMMUNITY

## 2020-02-17 RX ORDER — SODIUM CHLORIDE 0.9 % (FLUSH) 0.9 %
3-10 SYRINGE (ML) INJECTION AS NEEDED
Status: DISCONTINUED | OUTPATIENT
Start: 2020-02-17 | End: 2020-02-17 | Stop reason: HOSPADM

## 2020-02-17 RX ORDER — SODIUM CHLORIDE, SODIUM LACTATE, POTASSIUM CHLORIDE, CALCIUM CHLORIDE 600; 310; 30; 20 MG/100ML; MG/100ML; MG/100ML; MG/100ML
9 INJECTION, SOLUTION INTRAVENOUS CONTINUOUS
Status: DISCONTINUED | OUTPATIENT
Start: 2020-02-17 | End: 2020-02-20 | Stop reason: HOSPADM

## 2020-02-17 RX ORDER — ROCURONIUM BROMIDE 10 MG/ML
INJECTION, SOLUTION INTRAVENOUS AS NEEDED
Status: DISCONTINUED | OUTPATIENT
Start: 2020-02-17 | End: 2020-02-17 | Stop reason: SURG

## 2020-02-17 RX ORDER — EPHEDRINE SULFATE 50 MG/ML
5 INJECTION, SOLUTION INTRAVENOUS ONCE AS NEEDED
Status: DISCONTINUED | OUTPATIENT
Start: 2020-02-17 | End: 2020-02-17 | Stop reason: HOSPADM

## 2020-02-17 RX ORDER — DIPHENHYDRAMINE HYDROCHLORIDE 50 MG/ML
12.5 INJECTION INTRAMUSCULAR; INTRAVENOUS
Status: DISCONTINUED | OUTPATIENT
Start: 2020-02-17 | End: 2020-02-17 | Stop reason: HOSPADM

## 2020-02-17 RX ORDER — NALOXONE HCL 0.4 MG/ML
0.1 VIAL (ML) INJECTION
Status: DISCONTINUED | OUTPATIENT
Start: 2020-02-17 | End: 2020-02-20 | Stop reason: HOSPADM

## 2020-02-17 RX ORDER — MIDAZOLAM HYDROCHLORIDE 1 MG/ML
2 INJECTION INTRAMUSCULAR; INTRAVENOUS
Status: DISCONTINUED | OUTPATIENT
Start: 2020-02-17 | End: 2020-02-17 | Stop reason: HOSPADM

## 2020-02-17 RX ORDER — GLYCOPYRROLATE 0.2 MG/ML
INJECTION INTRAMUSCULAR; INTRAVENOUS AS NEEDED
Status: DISCONTINUED | OUTPATIENT
Start: 2020-02-17 | End: 2020-02-17 | Stop reason: SURG

## 2020-02-17 RX ORDER — SODIUM CHLORIDE 9 MG/ML
9 INJECTION, SOLUTION INTRAVENOUS CONTINUOUS
Status: DISCONTINUED | OUTPATIENT
Start: 2020-02-17 | End: 2020-02-20 | Stop reason: HOSPADM

## 2020-02-17 RX ORDER — ONDANSETRON 2 MG/ML
4 INJECTION INTRAMUSCULAR; INTRAVENOUS ONCE AS NEEDED
Status: DISCONTINUED | OUTPATIENT
Start: 2020-02-17 | End: 2020-02-17 | Stop reason: HOSPADM

## 2020-02-17 RX ORDER — PROPOFOL 10 MG/ML
VIAL (ML) INTRAVENOUS AS NEEDED
Status: DISCONTINUED | OUTPATIENT
Start: 2020-02-17 | End: 2020-02-17 | Stop reason: SURG

## 2020-02-17 RX ORDER — DEXAMETHASONE SODIUM PHOSPHATE 10 MG/ML
INJECTION INTRAMUSCULAR; INTRAVENOUS AS NEEDED
Status: DISCONTINUED | OUTPATIENT
Start: 2020-02-17 | End: 2020-02-17 | Stop reason: SURG

## 2020-02-17 RX ORDER — CHOLECALCIFEROL (VITAMIN D3) 125 MCG
7.5 CAPSULE ORAL NIGHTLY
Status: DISCONTINUED | OUTPATIENT
Start: 2020-02-17 | End: 2020-02-20 | Stop reason: HOSPADM

## 2020-02-17 RX ORDER — DIPHENHYDRAMINE HCL 25 MG
25 CAPSULE ORAL
Status: DISCONTINUED | OUTPATIENT
Start: 2020-02-17 | End: 2020-02-17 | Stop reason: HOSPADM

## 2020-02-17 RX ORDER — OXYCODONE HYDROCHLORIDE AND ACETAMINOPHEN 5; 325 MG/1; MG/1
1 TABLET ORAL EVERY 4 HOURS PRN
Status: DISCONTINUED | OUTPATIENT
Start: 2020-02-17 | End: 2020-02-20 | Stop reason: HOSPADM

## 2020-02-17 RX ORDER — PROMETHAZINE HYDROCHLORIDE 25 MG/ML
6.25 INJECTION, SOLUTION INTRAMUSCULAR; INTRAVENOUS
Status: DISCONTINUED | OUTPATIENT
Start: 2020-02-17 | End: 2020-02-17 | Stop reason: HOSPADM

## 2020-02-17 RX ORDER — FAMOTIDINE 10 MG/ML
20 INJECTION, SOLUTION INTRAVENOUS ONCE
Status: COMPLETED | OUTPATIENT
Start: 2020-02-17 | End: 2020-02-17

## 2020-02-17 RX ORDER — LIDOCAINE HYDROCHLORIDE 10 MG/ML
0.5 INJECTION, SOLUTION EPIDURAL; INFILTRATION; INTRACAUDAL; PERINEURAL ONCE AS NEEDED
Status: DISCONTINUED | OUTPATIENT
Start: 2020-02-17 | End: 2020-02-17 | Stop reason: HOSPADM

## 2020-02-17 RX ORDER — PROMETHAZINE HYDROCHLORIDE 25 MG/1
25 SUPPOSITORY RECTAL ONCE AS NEEDED
Status: DISCONTINUED | OUTPATIENT
Start: 2020-02-17 | End: 2020-02-17 | Stop reason: HOSPADM

## 2020-02-17 RX ORDER — TRAZODONE HYDROCHLORIDE 50 MG/1
50 TABLET ORAL NIGHTLY
Status: DISCONTINUED | OUTPATIENT
Start: 2020-02-17 | End: 2020-02-20 | Stop reason: HOSPADM

## 2020-02-17 RX ADMIN — ROCURONIUM BROMIDE 70 MG: 10 INJECTION, SOLUTION INTRAVENOUS at 11:52

## 2020-02-17 RX ADMIN — MIDAZOLAM 1 MG: 1 INJECTION INTRAMUSCULAR; INTRAVENOUS at 08:49

## 2020-02-17 RX ADMIN — FENTANYL CITRATE 100 MCG: 50 INJECTION, SOLUTION INTRAMUSCULAR; INTRAVENOUS at 09:34

## 2020-02-17 RX ADMIN — HYDROMORPHONE HYDROCHLORIDE 0.5 MG: 1 INJECTION, SOLUTION INTRAMUSCULAR; INTRAVENOUS; SUBCUTANEOUS at 14:40

## 2020-02-17 RX ADMIN — SODIUM CHLORIDE, POTASSIUM CHLORIDE, SODIUM LACTATE AND CALCIUM CHLORIDE 9 ML/HR: 600; 310; 30; 20 INJECTION, SOLUTION INTRAVENOUS at 08:49

## 2020-02-17 RX ADMIN — FENTANYL CITRATE 50 MCG: 50 INJECTION, SOLUTION INTRAMUSCULAR; INTRAVENOUS at 12:22

## 2020-02-17 RX ADMIN — CEFAZOLIN SODIUM 2 G: 2 INJECTION, SOLUTION INTRAVENOUS at 18:33

## 2020-02-17 RX ADMIN — OXYCODONE HYDROCHLORIDE AND ACETAMINOPHEN 1 TABLET: 5; 325 TABLET ORAL at 18:37

## 2020-02-17 RX ADMIN — TRAZODONE HYDROCHLORIDE 50 MG: 50 TABLET ORAL at 22:29

## 2020-02-17 RX ADMIN — GLYCOPYRROLATE 0.5 MG: 0.2 INJECTION INTRAMUSCULAR; INTRAVENOUS at 12:23

## 2020-02-17 RX ADMIN — FENTANYL CITRATE 50 MCG: 50 INJECTION, SOLUTION INTRAMUSCULAR; INTRAVENOUS at 11:03

## 2020-02-17 RX ADMIN — HYDROMORPHONE HYDROCHLORIDE: 10 INJECTION, SOLUTION INTRAMUSCULAR; INTRAVENOUS; SUBCUTANEOUS at 13:05

## 2020-02-17 RX ADMIN — ONDANSETRON HYDROCHLORIDE 4 MG: 2 SOLUTION INTRAMUSCULAR; INTRAVENOUS at 11:55

## 2020-02-17 RX ADMIN — OXYCODONE HYDROCHLORIDE AND ACETAMINOPHEN 1 TABLET: 7.5; 325 TABLET ORAL at 14:10

## 2020-02-17 RX ADMIN — FENTANYL CITRATE 50 MCG: 50 INJECTION, SOLUTION INTRAMUSCULAR; INTRAVENOUS at 12:50

## 2020-02-17 RX ADMIN — DEXAMETHASONE SODIUM PHOSPHATE 8 MG: 10 INJECTION INTRAMUSCULAR; INTRAVENOUS at 10:00

## 2020-02-17 RX ADMIN — OXYCODONE HYDROCHLORIDE AND ACETAMINOPHEN 1 TABLET: 5; 325 TABLET ORAL at 22:29

## 2020-02-17 RX ADMIN — PROPOFOL 150 MG: 10 INJECTION, EMULSION INTRAVENOUS at 09:34

## 2020-02-17 RX ADMIN — HYDROMORPHONE HYDROCHLORIDE 0.5 MG: 1 INJECTION, SOLUTION INTRAMUSCULAR; INTRAVENOUS; SUBCUTANEOUS at 12:55

## 2020-02-17 RX ADMIN — IBUPROFEN 800 MG: 800 TABLET, FILM COATED ORAL at 22:29

## 2020-02-17 RX ADMIN — CYCLOBENZAPRINE 10 MG: 10 TABLET, FILM COATED ORAL at 20:12

## 2020-02-17 RX ADMIN — FENTANYL CITRATE 50 MCG: 50 INJECTION, SOLUTION INTRAMUSCULAR; INTRAVENOUS at 13:20

## 2020-02-17 RX ADMIN — FENTANYL CITRATE 50 MCG: 50 INJECTION, SOLUTION INTRAMUSCULAR; INTRAVENOUS at 09:54

## 2020-02-17 RX ADMIN — CYCLOBENZAPRINE 10 MG: 10 TABLET, FILM COATED ORAL at 14:39

## 2020-02-17 RX ADMIN — EPHEDRINE SULFATE 10 MG: 50 INJECTION INTRAVENOUS at 10:56

## 2020-02-17 RX ADMIN — IBUPROFEN 800 MG: 800 TABLET, FILM COATED ORAL at 16:32

## 2020-02-17 RX ADMIN — LIDOCAINE HYDROCHLORIDE 80 MG: 20 INJECTION, SOLUTION INFILTRATION; PERINEURAL at 09:34

## 2020-02-17 RX ADMIN — FAMOTIDINE 20 MG: 10 INJECTION INTRAVENOUS at 08:49

## 2020-02-17 RX ADMIN — CEFAZOLIN SODIUM 2 G: 2 INJECTION, SOLUTION INTRAVENOUS at 09:38

## 2020-02-17 RX ADMIN — EPHEDRINE SULFATE 10 MG: 50 INJECTION INTRAVENOUS at 10:59

## 2020-02-17 RX ADMIN — ROCURONIUM BROMIDE 50 MG: 10 INJECTION, SOLUTION INTRAVENOUS at 09:34

## 2020-02-17 RX ADMIN — SODIUM CHLORIDE 75 ML/HR: 9 INJECTION, SOLUTION INTRAVENOUS at 13:15

## 2020-02-17 RX ADMIN — ROCURONIUM BROMIDE 20 MG: 10 INJECTION, SOLUTION INTRAVENOUS at 11:04

## 2020-02-17 RX ADMIN — NEOSTIGMINE METHYLSULFATE 4 MG: 1 INJECTION INTRAMUSCULAR; INTRAVENOUS; SUBCUTANEOUS at 12:23

## 2020-02-17 NOTE — ANESTHESIA PREPROCEDURE EVALUATION
Anesthesia Evaluation     Patient summary reviewed and Nursing notes reviewed   no history of anesthetic complications:  NPO Solid Status: > 8 hours  NPO Liquid Status: > 2 hours           Airway   Mallampati: II  Dental - normal exam     Pulmonary - normal exam   (+) a smoker Former,   Cardiovascular - negative cardio ROS and normal exam        Neuro/Psych- negative ROS  GI/Hepatic/Renal/Endo - negative ROS     Musculoskeletal     Abdominal    Substance History      OB/GYN          Other                        Anesthesia Plan    ASA 2     general     intravenous induction     Anesthetic plan, all risks, benefits, and alternatives have been provided, discussed and informed consent has been obtained with: patient.

## 2020-02-17 NOTE — PLAN OF CARE
VSS postoperatively. PCA infusing, pt reporting manageable pain rated 5/10 in L shoulder. Distal pulses, sensation intact. Pt up to bathroom with standby assist. Continue to monitor

## 2020-02-17 NOTE — ANESTHESIA PROCEDURE NOTES
Airway  Urgency: elective    Date/Time: 2/17/2020 9:36 AM  Airway not difficult    General Information and Staff    Patient location during procedure: OR  CRNA: Sonia Tyler CRNA    Indications and Patient Condition  Indications for airway management: airway protection    Preoxygenated: yes  Mask difficulty assessment: 1 - vent by mask    Final Airway Details  Final airway type: endotracheal airway      Successful airway: ETT  Cuffed: yes   Successful intubation technique: direct laryngoscopy  Endotracheal tube insertion site: oral  Blade: Sara  Blade size: 3  ETT size (mm): 7.5  Cormack-Lehane Classification: grade I - full view of glottis  Placement verified by: chest auscultation and capnometry   Measured from: lips  ETT/EBT  to lips (cm): 22  Number of attempts at approach: 1  Assessment: lips, teeth, and gum same as pre-op and atraumatic intubation    Additional Comments   ett cuff up at MOP

## 2020-02-17 NOTE — OP NOTE
[facility]  Rad Baltazar  1985     Brief Operative Note    02/17/20   Dejuan White MD      Preoperative Diagnosis: Venous thoracic outlet syndrome    Postoperative Diagnosis: same as above    Procedure Performed:  1) Removal of first rib (27477)  2) Scalenectomy (Anterior and middle) (surgical procedure on the soft tissues of the neck) (57917)  3) Exploration not followed by repair of subclavian artery (60514)     Surgeon: Dejuan White MD    Assistant: Skip Sosa MD and they provided critical assistance during the case including suctioning, exposure, retraction, and reduction of blood loss.    Anesthesia: General Anesthesia via Endotracheal Tube    Estimated Blood Loss: 100ml    Specimen: A: First rib with anterior and medial scalenes    Complications: None    Dispo: awakened from anesthesia, extubated and taken to the recovery room in a stable condition, having suffered no apparent untoward event.    Indication for procedure: 34 y.o. male with a history of effort induced left subclavian vein thrombosis who has failed medical management presents with arm swelling and is taken for first rib resection      Description of procedure: The patient was taken to the operating room and placed in the supine position.  General anesthesia was induced.  The left chest and neck were prepped and draped in usual sterile fashion.  A timeout was reviewed.  Supraclavicular incision was made.  The platysma was divided.  The clavicular head of the sternocleidomastoid muscle was divided.  The scalene fat pad was mobilized laterally.  The anterior scalene was found.  The phrenic nerve was mobilized by incising its fascia and then placing a vessel loop.  The anterior scalene was dissected on its anterior, medial, lateral borders.  It was dissected as high as possible and the incision and then transected.  The lower portion was transected and it was excised.  It weighed 5.7 g.    The brachial plexus  was circumferentially dissected.  The middle scalene muscle was found and was dissected and then excised in a similar fashion as the anterior scalene.  The level of excision of the middle scalene was just below the long thoracic nerve which was clearly visualized and protected.  The middle scalene excision weighed 5.1 g.  Subclavian artery was identified, circumferentially dissected and protected with a vessel loop.  The posterior portion of the first rib was divided using a Kerrison rondure followed by the anterior portion.  The rest of the intercostal muscles were divided.  No damage to the pleura occurred.  The room was removed.    A subclavicular incision was made and the pectoralis muscle was divided in the direction of its fibers.  The anterior border of the medial portion of the first rib was exposed at its cartilaginous portion and this was dissected in a deep oriented direction until the previous transection point was identified.  The rest of this rib was then removed using a Kerrison rondure and dividing the intercostal muscles.    Hemostasis was then obtained.  The wound was filled with saline and a Valsalva maneuver was performed without any bubbles seen.  FloSeal was used after the wound had been irrigated multiple times.  The sternocleidomastoid was reapproximated with 2-0 Vicryls in a mattress fashion.  The platysma was closed with 3-0 Vicryl.  The skin was closed with 4-0 Vicryl.  The subclavicular incision was closed using 2-0 Vicryl on the fascia, 3 oh on the subcutaneous, 4 oh on the skin.  Dermabond was used.  The patient tolerated the procedure well.          Dejuan White MD  02/17/20

## 2020-02-17 NOTE — INTERVAL H&P NOTE
H&P reviewed.  The patient was examined and there are no changes to the H&P check chest x-ray and EKG for preop.  No events since I saw him last.  Continues to have some chronic swelling.  Plan for first rib resection today.

## 2020-02-18 ENCOUNTER — APPOINTMENT (OUTPATIENT)
Dept: GENERAL RADIOLOGY | Facility: HOSPITAL | Age: 35
End: 2020-02-18

## 2020-02-18 LAB
ANION GAP SERPL CALCULATED.3IONS-SCNC: 13.7 MMOL/L (ref 5–15)
BASOPHILS # BLD AUTO: 0.01 10*3/MM3 (ref 0–0.2)
BASOPHILS NFR BLD AUTO: 0.1 % (ref 0–1.5)
BUN BLD-MCNC: 14 MG/DL (ref 6–20)
BUN/CREAT SERPL: 16.5 (ref 7–25)
CALCIUM SPEC-SCNC: 8.4 MG/DL (ref 8.6–10.5)
CHLORIDE SERPL-SCNC: 101 MMOL/L (ref 98–107)
CO2 SERPL-SCNC: 24.3 MMOL/L (ref 22–29)
CREAT BLD-MCNC: 0.85 MG/DL (ref 0.76–1.27)
DEPRECATED RDW RBC AUTO: 38.1 FL (ref 37–54)
EOSINOPHIL # BLD AUTO: 0.01 10*3/MM3 (ref 0–0.4)
EOSINOPHIL NFR BLD AUTO: 0.1 % (ref 0.3–6.2)
ERYTHROCYTE [DISTWIDTH] IN BLOOD BY AUTOMATED COUNT: 11.8 % (ref 12.3–15.4)
GFR SERPL CREATININE-BSD FRML MDRD: 104 ML/MIN/1.73
GLUCOSE BLD-MCNC: 121 MG/DL (ref 65–99)
HCT VFR BLD AUTO: 39.2 % (ref 37.5–51)
HGB BLD-MCNC: 14 G/DL (ref 13–17.7)
IMM GRANULOCYTES # BLD AUTO: 0.03 10*3/MM3 (ref 0–0.05)
IMM GRANULOCYTES NFR BLD AUTO: 0.3 % (ref 0–0.5)
LYMPHOCYTES # BLD AUTO: 1.65 10*3/MM3 (ref 0.7–3.1)
LYMPHOCYTES NFR BLD AUTO: 17.2 % (ref 19.6–45.3)
MCH RBC QN AUTO: 32.1 PG (ref 26.6–33)
MCHC RBC AUTO-ENTMCNC: 35.7 G/DL (ref 31.5–35.7)
MCV RBC AUTO: 89.9 FL (ref 79–97)
MONOCYTES # BLD AUTO: 0.86 10*3/MM3 (ref 0.1–0.9)
MONOCYTES NFR BLD AUTO: 8.9 % (ref 5–12)
NEUTROPHILS # BLD AUTO: 7.06 10*3/MM3 (ref 1.7–7)
NEUTROPHILS NFR BLD AUTO: 73.4 % (ref 42.7–76)
NRBC BLD AUTO-RTO: 0 /100 WBC (ref 0–0.2)
PLATELET # BLD AUTO: 170 10*3/MM3 (ref 140–450)
PMV BLD AUTO: 10.7 FL (ref 6–12)
POTASSIUM BLD-SCNC: 4.4 MMOL/L (ref 3.5–5.2)
RBC # BLD AUTO: 4.36 10*6/MM3 (ref 4.14–5.8)
SODIUM BLD-SCNC: 139 MMOL/L (ref 136–145)
WBC NRBC COR # BLD: 9.62 10*3/MM3 (ref 3.4–10.8)

## 2020-02-18 PROCEDURE — 97116 GAIT TRAINING THERAPY: CPT

## 2020-02-18 PROCEDURE — 80048 BASIC METABOLIC PNL TOTAL CA: CPT | Performed by: SURGERY

## 2020-02-18 PROCEDURE — 25010000003 CEFAZOLIN IN DEXTROSE 2-4 GM/100ML-% SOLUTION: Performed by: SURGERY

## 2020-02-18 PROCEDURE — 25010000002 MORPHINE (PF) 10 MG/ML SOLUTION: Performed by: SURGERY

## 2020-02-18 PROCEDURE — 25010000002 DIPHENHYDRAMINE PER 50 MG: Performed by: SURGERY

## 2020-02-18 PROCEDURE — 97161 PT EVAL LOW COMPLEX 20 MIN: CPT

## 2020-02-18 PROCEDURE — 71045 X-RAY EXAM CHEST 1 VIEW: CPT

## 2020-02-18 PROCEDURE — 25010000002 ENOXAPARIN PER 10 MG: Performed by: SURGERY

## 2020-02-18 PROCEDURE — 85025 COMPLETE CBC W/AUTO DIFF WBC: CPT | Performed by: SURGERY

## 2020-02-18 RX ORDER — DIPHENHYDRAMINE HYDROCHLORIDE 50 MG/ML
25 INJECTION INTRAMUSCULAR; INTRAVENOUS EVERY 6 HOURS PRN
Status: DISCONTINUED | OUTPATIENT
Start: 2020-02-18 | End: 2020-02-20 | Stop reason: HOSPADM

## 2020-02-18 RX ORDER — NALOXONE HCL 0.4 MG/ML
0.1 VIAL (ML) INJECTION
Status: DISCONTINUED | OUTPATIENT
Start: 2020-02-18 | End: 2020-02-20 | Stop reason: HOSPADM

## 2020-02-18 RX ORDER — MORPHINE SULFATE IN 0.9 % NACL 50 MG/50ML
PATIENT CONTROLLED ANALGESIA SYRINGE INTRAVENOUS CONTINUOUS
Status: DISCONTINUED | OUTPATIENT
Start: 2020-02-18 | End: 2020-02-20 | Stop reason: HOSPADM

## 2020-02-18 RX ADMIN — CYCLOBENZAPRINE 10 MG: 10 TABLET, FILM COATED ORAL at 08:30

## 2020-02-18 RX ADMIN — TRAZODONE HYDROCHLORIDE 50 MG: 50 TABLET ORAL at 22:00

## 2020-02-18 RX ADMIN — Medication 7.5 MG: at 21:59

## 2020-02-18 RX ADMIN — SODIUM CHLORIDE 75 ML/HR: 9 INJECTION, SOLUTION INTRAVENOUS at 00:32

## 2020-02-18 RX ADMIN — PROPRANOLOL HYDROCHLORIDE 10 MG: 10 TABLET ORAL at 08:30

## 2020-02-18 RX ADMIN — OXYCODONE HYDROCHLORIDE AND ACETAMINOPHEN 1 TABLET: 5; 325 TABLET ORAL at 10:16

## 2020-02-18 RX ADMIN — MORPHINE SULFATE: 10 INJECTION INTRAVENOUS at 18:11

## 2020-02-18 RX ADMIN — CYCLOBENZAPRINE 10 MG: 10 TABLET, FILM COATED ORAL at 17:01

## 2020-02-18 RX ADMIN — CYCLOBENZAPRINE 10 MG: 10 TABLET, FILM COATED ORAL at 21:59

## 2020-02-18 RX ADMIN — OXYCODONE HYDROCHLORIDE AND ACETAMINOPHEN 1 TABLET: 5; 325 TABLET ORAL at 06:14

## 2020-02-18 RX ADMIN — OXYCODONE HYDROCHLORIDE AND ACETAMINOPHEN 1 TABLET: 5; 325 TABLET ORAL at 18:41

## 2020-02-18 RX ADMIN — IBUPROFEN 800 MG: 800 TABLET, FILM COATED ORAL at 14:12

## 2020-02-18 RX ADMIN — IBUPROFEN 800 MG: 800 TABLET, FILM COATED ORAL at 22:00

## 2020-02-18 RX ADMIN — DIPHENHYDRAMINE HYDROCHLORIDE 25 MG: 50 INJECTION, SOLUTION INTRAMUSCULAR; INTRAVENOUS at 12:55

## 2020-02-18 RX ADMIN — IBUPROFEN 800 MG: 800 TABLET, FILM COATED ORAL at 05:35

## 2020-02-18 RX ADMIN — ENOXAPARIN SODIUM 30 MG: 30 INJECTION SUBCUTANEOUS at 21:59

## 2020-02-18 RX ADMIN — ENOXAPARIN SODIUM 30 MG: 30 INJECTION SUBCUTANEOUS at 08:30

## 2020-02-18 RX ADMIN — OXYCODONE HYDROCHLORIDE AND ACETAMINOPHEN 1 TABLET: 5; 325 TABLET ORAL at 23:01

## 2020-02-18 RX ADMIN — OXYCODONE HYDROCHLORIDE AND ACETAMINOPHEN 1 TABLET: 5; 325 TABLET ORAL at 14:48

## 2020-02-18 RX ADMIN — CEFAZOLIN SODIUM 2 G: 2 INJECTION, SOLUTION INTRAVENOUS at 02:17

## 2020-02-18 RX ADMIN — OXYCODONE HYDROCHLORIDE AND ACETAMINOPHEN 1 TABLET: 5; 325 TABLET ORAL at 02:17

## 2020-02-18 NOTE — ANESTHESIA POSTPROCEDURE EVALUATION
"Patient: Rad Baltazar    Procedure Summary     Date:  02/17/20 Room / Location:  [facility] OR 04 /  [facility]    Anesthesia Start:  0929 Anesthesia Stop:  1237    Procedure:  LEFT FIRST RIB RESECTION (Left Neck) Diagnosis:      Surgeon:  Dejuan White MD Provider:  Braulio Andrade MD    Anesthesia Type:  general ASA Status:  2          Anesthesia Type: general    Vitals  Vitals Value Taken Time   /65 2/17/2020  3:30 PM   Temp 36.4 °C (97.6 °F) 2/17/2020  3:30 PM   Pulse 78 2/17/2020  3:37 PM   Resp 16 2/17/2020  3:30 PM   SpO2 99 % 2/17/2020  3:39 PM   Vitals shown include unvalidated device data.        Post Anesthesia Care and Evaluation    Patient participation: complete - patient cannot participate  Anesthetic complications: No anesthetic complications    Cardiovascular status: acceptable  Respiratory status: acceptable  Hydration status: acceptable    Comments: Patient was discharged prior to being evaluated by Anesthesia...per chart review, no anesthetic complications were noted.  NOT MEDICALLY DIRECTED  /67 (BP Location: Right arm, Patient Position: Lying)   Pulse 78   Temp 36.8 °C (98.3 °F) (Oral)   Resp 16   Ht 193 cm (76\")   Wt 81 kg (178 lb 8 oz)   SpO2 99%   BMI 21.73 kg/m²         "

## 2020-02-18 NOTE — NURSING NOTE
"Pt put on call light at 1235 to report feeling \"itchy all over.\" Pt bioxing 96% on RA, denies SOA and throat swelling, no visible rash, no respiratory distress, alert and oriented. PCA reviewed and found to have delivered a total of 0.4mg dilaudid within 35 minutes but pt denies pressing PCA button in that time. Dr White called, verbal order given for 25mg IV Benadryl. Pt now reports \"I feel a little better.\" VSS, continue to monitor. Family at bedside, call light within reach, pt attached to continuous oxygen monitor.   "

## 2020-02-18 NOTE — PLAN OF CARE
Problem: Patient Care Overview  Goal: Plan of Care Review  Outcome: Ongoing (interventions implemented as appropriate)  Flowsheets (Taken 2/18/2020 0328 by Sarahy Mcfarlane RN)  Plan of Care Reviewed With: patient  Note:   Pt is a 35 yo M s/p L 1st rib resection. Pt reports PLOF as independent, no DME needs at DC. Pt presents with with moderate L shoulder/chest incisional pain. Pt educated on precautions to include avoiding excessive shoulder elevation/depression, limiting shoulder ROM to avoid overhead motions and maintaining neutral GH rotation within first 2-3 weeks. Pt educated on HEP to include cervical ROM including chin tucks as well as nerve glides (ulnar,median,radial). 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 DC recs include home with OPPT to follow. No acute PT needed at this time.

## 2020-02-18 NOTE — PROGRESS NOTES
Name: Rad Baltazar ADMIT: 2020   : 1985  PCP: Dar Garcia MD    MRN: [id number] LOS: 1 days   AGE/SEX: 34 y.o. male  ROOM: [address]    Active Hospital Problems    Diagnosis  POA   • Venous thoracic outlet syndrome of left subclavian vein [I87.1]  Yes      Resolved Hospital Problems   No resolved problems to display.     Subjective     34 y.o. male status post left first rib resection for venous thoracic outlet syndrome.  No shortness of breath.  Having the appropriate postoperative pain.    Review of Systems  As above  Objective     Vital Signs  Temp:  [97.5 °F (36.4 °C)-98.3 °F (36.8 °C)] 98 °F (36.7 °C)  Heart Rate:  [60-98] 78  Resp:  [14-16] 16  BP: (111-136)/() 118/59    Physical Exam  General: Alert and oriented  CV: Regular rate and rhythm  Respiratory: Unlabored breathing on room oxygen  GI: Abdomen is soft, nontender  Extremities: Incision clean, dry, intact.  No hematoma.  SHERIF with 80 mL out overnight, serosanguineous fluid  Left hand neurologically intact.      Results Review:       I reviewed the patient's new clinical results.  Results from last 7 days   Lab Units 20  0528   WBC 10*3/mm3 9.62   HEMOGLOBIN g/dL 14.0   PLATELETS 10*3/mm3 170     Results from last 7 days   Lab Units 20  0528   SODIUM mmol/L 139   POTASSIUM mmol/L 4.4   CHLORIDE mmol/L 101   CO2 mmol/L 24.3   BUN mg/dL 14   CREATININE mg/dL 0.85   GLUCOSE mg/dL 121*   Estimated Creatinine Clearance: 141.6 mL/min (by C-G formula based on SCr of 0.85 mg/dL).  Results from last 7 days   Lab Units 20  0528   CALCIUM mg/dL 8.4*       Assessment/Plan           Active Hospital Problems    Diagnosis  POA   • Venous thoracic outlet syndrome of left subclavian vein [I87.1]  Yes      Resolved Hospital Problems   No resolved problems to display.        Assessment & Plan  34 y.o. male postoperative day #1 status post left first rib resection.  Chest x-ray clear with slight apical.  Diaphragm  normal.  Discontinue IV fluids today.  Mobilize.  We will have physical therapy see him for range of motion with the left shoulder.        Dejuan White MD  02/18/20   @NOW@  Office Number [phone]

## 2020-02-18 NOTE — THERAPY EVALUATION
Patient Name: Rad Baltazar  : 1985    MRN: [id number]                              Today's Date: 2020       Admit Date: 2020    Visit Dx:     ICD-10-CM ICD-9-CM   1. Venous thoracic outlet syndrome of left subclavian vein I87.1 459.2     Patient Active Problem List   Diagnosis   • Venous thoracic outlet syndrome of left subclavian vein     Past Medical History:   Diagnosis Date   • Arm vein blood clot, left      Past Surgical History:   Procedure Laterality Date   • FIRST RIB RESECTION Left 2020    Procedure: LEFT FIRST RIB RESECTION;  Surgeon: Dejuan White MD;  Location: [facility] OR;  Service: Vascular;  Laterality: Left;   • VENOGRAM PERIPHERAL Left 2019    Procedure: LEFT ARM VENOGRAM AND THROMOLYSIS;  Surgeon: Dejuan White MD;  Location: [facility] OR ;  Service: Vascular   • WISDOM TOOTH EXTRACTION       General Information     Row Name 20 1135          PT Evaluation Time/Intention    Document Type  evaluation  -AE     Mode of Treatment  physical therapy  -AE     Row Name 20 1135          General Information    Patient Profile Reviewed?  yes  -AE     Prior Level of Function  independent:  -AE     Row Name 20 1135          Relationship/Environment    Lives With  alone  -AE     Row Name 20 1135          Resource/Environmental Concerns    Current Living Arrangements  home/apartment/condo  -AE     Row Name 20 1135          Home Main Entrance    Number of Stairs, Main Entrance  none  -AE     Row Name 20 1135          Stairs Within Home, Primary    Number of Stairs, Within Home, Primary  none  -AE     Row Name 20 1135          Cognitive Assessment/Intervention- PT/OT    Orientation Status (Cognition)  oriented x 4  -AE     Row Name 20 1135          Safety Issues, Functional Mobility    Impairments Affecting Function (Mobility)  pain;range of motion (ROM)  -AE       User Key  (r) = Recorded By, (t) =  Taken By, (c) = Cosigned By    Initials Name Provider Type    AE Laurel Quiles, ELISHA Physical Therapist        Mobility     Row Name 02/18/20 1136          Bed Mobility Assessment/Treatment    Bed Mobility Assessment/Treatment  bed mobility (all) activities  -AE     [place] Level (Bed Mobility)  supervision  -AE     Row Name 02/18/20 1136          Transfer Assessment/Treatment    Comment (Transfers)  pt supervision for all transfers with no AD  -AE     Row Name 02/18/20 1136          Bed-Chair Transfer    Bed-Chair [place] (Transfers)  supervision  -AE     Row Name 02/18/20 1136          Sit-Stand Transfer    Sit-Stand [place] (Transfers)  supervision  -AE     Row Name 02/18/20 1136          Gait/Stairs Assessment/Training    Gait/Stairs Assessment/Training  gait/ambulation independence;gait/ambulation assistive device  -AE     [place] Level (Gait)  supervision  -AE     Distance in Feet (Gait)  300 ft no AD  -AE     Pattern (Gait)  step-through  -AE     Row Name 02/18/20 1136          Mobility Assessment/Intervention    Extremity Weight-bearing Status  left upper extremity  -AE     Left Upper Extremity (Weight-bearing Status)  weight-bearing as tolerated (WBAT)  -AE       User Key  (r) = Recorded By, (t) = Taken By, (c) = Cosigned By    Initials Name Provider Type    AE Laurel Quiles PT Physical Therapist        Obj/Interventions     Row Name 02/18/20 1137          General ROM    GENERAL ROM COMMENTS  LUE wrist/elbow WFL; LUE shoulder limited due to pain and TOS protocol (90deg)  -AE     Row Name 02/18/20 1137          MMT (Manual Muscle Testing)    General MMT Comments  LUE not tested per TOS protocol; BLE WFL  -AE     Row Name 02/18/20 1137          Therapeutic Exercise    Comment (Therapeutic Exercise)  HEP for median, ulnar, radial nerve glides and chin tuck  -AE       User Key  (r) = Recorded By, (t) = Taken By, (c) = Cosigned By    Initials Name Provider Type    AE Laurel Quiles,  PT Physical Therapist        Goals/Plan    No documentation.       Clinical Impression     Row Name 02/18/20 1138          Pain Assessment    Additional Documentation  Pain Scale: Numbers Pre/Post-Treatment (Group)  -AE     Row Name 02/18/20 1138          Pain Scale: Numbers Pre/Post-Treatment    Pain Scale: Numbers, Pretreatment  5/10  -AE     Pain Scale: Numbers, Post-Treatment  5/10  -AE     Pain Location - Side  Left  -AE     Pain Location - Orientation  incisional  -AE     Pain Location  shoulder  -AE     Pain Intervention(s)  Medication (See MAR);Repositioned;Ambulation/increased activity;Rest  -AE     Row Name 02/18/20 1138          Physical Therapy Clinical Impression    Criteria for Skilled Interventions Met (PT Clinical Impression)  no;no significant expected improvement in functional status  -AE     Row Name 02/18/20 1138          Positioning and Restraints    Pre-Treatment Position  in bed  -AE     Post Treatment Position  bed  -AE     In Bed  supine;call light within reach;encouraged to call for assist;exit alarm on;patient within staff view  -AE       User Key  (r) = Recorded By, (t) = Taken By, (c) = Cosigned By    Initials Name Provider Type    AE Laurel Quiles, PT Physical Therapist        Outcome Measures     Row Name 02/18/20 1139          How much help from another person do you currently need...    Turning from your back to your side while in flat bed without using bedrails?  4  -AE     Moving from lying on back to sitting on the side of a flat bed without bedrails?  3  -AE     Moving to and from a bed to a chair (including a wheelchair)?  4  -AE     Standing up from a chair using your arms (e.g., wheelchair, bedside chair)?  4  -AE     Climbing 3-5 steps with a railing?  4  -AE     To walk in hospital room?  4  -AE     AM-PAC 6 Clicks Score (PT)  23  -AE     Row Name 02/18/20 1139          Functional Assessment    Outcome Measure Options  AM-PAC 6 Clicks Basic Mobility (PT)  -AE       User  Key  (r) = Recorded By, (t) = Taken By, (c) = Cosigned By    Initials Name Provider Type    AE Laurel Quiles PT Physical Therapist          PT Recommendation and Plan     Outcome Summary/Treatment Plan (PT)  Anticipated Discharge Disposition (PT): home, home with OP services     Time Calculation:   PT Charges     Row Name 02/18/20 1145             Time Calculation    Start Time  1020  -AE      Stop Time  1053  -AE      Time Calculation (min)  33 min  -AE         Time Calculation- PT    Total Timed Code Minutes- PT  20 minute(s)  -AE        User Key  (r) = Recorded By, (t) = Taken By, (c) = Cosigned By    Initials Name Provider Type    Laurel Ortiz, PT Physical Therapist        Therapy Charges for Today     Code Description Service Date Service Provider Modifiers Qty    38551825897 HC PT EVAL LOW COMPLEXITY 2 2/18/2020 Laurel Quiles, PT GP 1    52703748690 HC GAIT TRAINING EA 15 MIN 2/18/2020 Laurel Quiles, PT GP 1          PT G-Codes  Outcome Measure Options: AM-PAC 6 Clicks Basic Mobility (PT)  AM-PAC 6 Clicks Score (PT): 23    Laurel Quiles PT  2/18/2020

## 2020-02-18 NOTE — PLAN OF CARE
Problem: Patient Care Overview  Goal: Plan of Care Review  Outcome: Ongoing (interventions implemented as appropriate)  Flowsheets (Taken 2/18/2020 0328)  Progress: improving  Plan of Care Reviewed With: patient  Outcome Summary: S/p left first rib resection. Left upper chest incisions x 2 KAITLYN with dermabond and soft. SHERIF to left chest with small amount of serosang drainage. Medicated Q4 with Percocet, Dilaudid PCA infusing, using intermittently. Up with assist to RR multiple times through night without issue. Ambulates well, no weakness noted. VSS, will continue to monitor.  Goal: Individualization and Mutuality  Outcome: Ongoing (interventions implemented as appropriate)  Goal: Discharge Needs Assessment  Outcome: Ongoing (interventions implemented as appropriate)  Goal: Interprofessional Rounds/Family Conf  Outcome: Ongoing (interventions implemented as appropriate)     Problem: Pain, Acute (Adult)  Goal: Identify Related Risk Factors and Signs and Symptoms  Outcome: Ongoing (interventions implemented as appropriate)  Goal: Acceptable Pain Control/Comfort Level  Outcome: Ongoing (interventions implemented as appropriate)  Flowsheets (Taken 2/18/2020 0328)  Acceptable Pain Control/Comfort Level: making progress toward outcome     Problem: Breathing Pattern Ineffective (Adult)  Goal: Identify Related Risk Factors and Signs and Symptoms  Outcome: Ongoing (interventions implemented as appropriate)  Goal: Effective Oxygenation/Ventilation  Outcome: Ongoing (interventions implemented as appropriate)  Flowsheets (Taken 2/18/2020 0328)  Effective Oxygenation/Ventilation: making progress toward outcome  Goal: Anxiety/Fear Reduction  Outcome: Ongoing (interventions implemented as appropriate)     Problem: Fall Risk (Adult)  Goal: Identify Related Risk Factors and Signs and Symptoms  Outcome: Ongoing (interventions implemented as appropriate)  Goal: Absence of Fall  Outcome: Ongoing (interventions implemented as  appropriate)  Flowsheets (Taken 2/18/2020 0328)  Absence of Fall: making progress toward outcome

## 2020-02-18 NOTE — PLAN OF CARE
VSS, bioxing well on RA. Redness around TORSTEN chest incisions, 50mL drainage emptied from SHERIF drain during shift. Pt states pain well controlled, now switched to Morphine PCA instead of dilaudid for pt c/o itching with dilaudid. Pt ambulates well with standby assist. IS at bedside, pulmonary hygiene encouraged. Continue to monitor

## 2020-02-19 LAB
LAB AP CASE REPORT: NORMAL
PATH REPORT.FINAL DX SPEC: NORMAL
PATH REPORT.GROSS SPEC: NORMAL

## 2020-02-19 PROCEDURE — 25010000002 ENOXAPARIN PER 10 MG: Performed by: SURGERY

## 2020-02-19 RX ORDER — DOCUSATE SODIUM 100 MG/1
100 CAPSULE, LIQUID FILLED ORAL 2 TIMES DAILY PRN
Status: DISCONTINUED | OUTPATIENT
Start: 2020-02-19 | End: 2020-02-20 | Stop reason: HOSPADM

## 2020-02-19 RX ADMIN — IBUPROFEN 800 MG: 800 TABLET, FILM COATED ORAL at 22:03

## 2020-02-19 RX ADMIN — CYCLOBENZAPRINE 10 MG: 10 TABLET, FILM COATED ORAL at 08:00

## 2020-02-19 RX ADMIN — OXYCODONE HYDROCHLORIDE AND ACETAMINOPHEN 1 TABLET: 5; 325 TABLET ORAL at 07:22

## 2020-02-19 RX ADMIN — Medication 7.5 MG: at 22:04

## 2020-02-19 RX ADMIN — ENOXAPARIN SODIUM 30 MG: 30 INJECTION SUBCUTANEOUS at 11:46

## 2020-02-19 RX ADMIN — IBUPROFEN 800 MG: 800 TABLET, FILM COATED ORAL at 14:18

## 2020-02-19 RX ADMIN — PROPRANOLOL HYDROCHLORIDE 10 MG: 10 TABLET ORAL at 08:00

## 2020-02-19 RX ADMIN — DOCUSATE SODIUM 100 MG: 100 CAPSULE, LIQUID FILLED ORAL at 22:04

## 2020-02-19 RX ADMIN — IBUPROFEN 800 MG: 800 TABLET, FILM COATED ORAL at 07:22

## 2020-02-19 RX ADMIN — APIXABAN 5 MG: 5 TABLET, FILM COATED ORAL at 20:15

## 2020-02-19 RX ADMIN — CYCLOBENZAPRINE 10 MG: 10 TABLET, FILM COATED ORAL at 20:15

## 2020-02-19 RX ADMIN — OXYCODONE HYDROCHLORIDE AND ACETAMINOPHEN 1 TABLET: 5; 325 TABLET ORAL at 16:00

## 2020-02-19 RX ADMIN — OXYCODONE HYDROCHLORIDE AND ACETAMINOPHEN 1 TABLET: 5; 325 TABLET ORAL at 03:12

## 2020-02-19 RX ADMIN — CYCLOBENZAPRINE 10 MG: 10 TABLET, FILM COATED ORAL at 16:00

## 2020-02-19 RX ADMIN — OXYCODONE HYDROCHLORIDE AND ACETAMINOPHEN 1 TABLET: 5; 325 TABLET ORAL at 11:46

## 2020-02-19 RX ADMIN — TRAZODONE HYDROCHLORIDE 50 MG: 50 TABLET ORAL at 22:03

## 2020-02-19 RX ADMIN — OXYCODONE HYDROCHLORIDE AND ACETAMINOPHEN 1 TABLET: 5; 325 TABLET ORAL at 20:14

## 2020-02-19 NOTE — PLAN OF CARE
Pt s/p left 1st rib resection done 2/17/20. Pain controlled with Morphine pca pump and prn pain meds.  SHERIF drain remains.  Pt independent.  Will continue to monitor.

## 2020-02-19 NOTE — PROGRESS NOTES
Discharge Planning Assessment  [facility]     Patient Name: Rad Baltazar  MRN: [id number]  Today's Date: 2/19/2020    Admit Date: 2/17/2020    Discharge Needs Assessment     Row Name 02/19/20 1545       Living Environment    Lives With  alone    Current Living Arrangements  home/apartment/condo    Primary Care Provided by  self    Provides Primary Care For  no one    Family Caregiver if Needed  parent(s)       Resource/Environmental Concerns    Resource/Environmental Concerns  none       Transition Planning    Patient/Family Anticipates Transition to  home    Patient/Family Anticipated Services at Transition  none       Discharge Needs Assessment    Concerns to be Addressed  no discharge needs identified    Equipment Currently Used at Home  none    Equipment Needed After Discharge  none    Outpatient/Agency/Support Group Needs  outpatient therapy        Discharge Plan     Row Name 02/19/20 1545       Plan    Plan  Home    Provided post acute provider list?  Yes    Post Acute Provider List  Outpatient Therapy    Delivered To  Patient    Method of Delivery  In person    Patient/Family in Agreement with Plan  yes    Plan Comments  Met with pt at bedside. Introduced self, explained CCP role, facesheet verified.  Pt states he lives alone and is independent with ADLs.  Plans to return home at discharge.  Will likely need outpt PT.  List of outpt rehab facilities provided to pt using CMS compare.  Pt to notify CCP of choice and CCP will fax orders when received.  JORDAN Mckenzie RN        Destination      Coordination has not been started for this encounter.      Durable Medical Equipment      Coordination has not been started for this encounter.      Dialysis/Infusion      Coordination has not been started for this encounter.      Home Medical Care      Coordination has not been started for this encounter.      Therapy      Coordination has not been started for this encounter.      Community Resources      Coordination  has not been started for this encounter.          Demographic Summary     Row Name 02/19/20 1544       General Information    Admission Type  inpatient    Arrived From  home    Referral Source  admission list    Reason for Consult  discharge planning    Preferred Language  English       Contact Information    Permission Granted to Share Info With  family/designee Scott Baltazar (father) [phone]        Functional Status     Row Name 02/19/20 1544       Functional Status, IADL    Medications  independent    Meal Preparation  independent    Housekeeping  independent    Laundry  independent    Shopping  independent        Psychosocial    No documentation.       Abuse/Neglect    No documentation.       Legal    No documentation.       Substance Abuse    No documentation.       Patient Forms    No documentation.           Dee Dee Mckenzie RN

## 2020-02-19 NOTE — PROGRESS NOTES
Name: Rad Baltazar ADMIT: 2020   : 1985  PCP: Dar Garcia MD    MRN: [id number] LOS: 2 days   AGE/SEX: 34 y.o. male  ROOM: [address]    Active Hospital Problems    Diagnosis  POA   • Venous thoracic outlet syndrome of left subclavian vein [I87.1]  Yes      Resolved Hospital Problems   No resolved problems to display.     Subjective     34 y.o. male status post left first rib resection for venous thoracic outlet syndrome.  No shortness of breath.  Having the appropriate postoperative pain.    Review of Systems  As above  Objective     Vital Signs  Temp:  [97.7 °F (36.5 °C)-98.5 °F (36.9 °C)] 97.7 °F (36.5 °C)  Heart Rate:  [67-80] 67  Resp:  [14-16] 16  BP: (126-128)/(64-85) 126/64    Physical Exam  General: Alert and oriented  CV: Regular rate and rhythm  Respiratory: Unlabored breathing on room oxygen  GI: Abdomen is soft, nontender  Extremities: Incision clean, dry, intact.  No hematoma.  SHERIF with 60 mL out overnight, serosanguineous fluid  Left hand neurologically intact.      Results Review:       I reviewed the patient's new clinical results.  Results from last 7 days   Lab Units 20  0528   WBC 10*3/mm3 9.62   HEMOGLOBIN g/dL 14.0   PLATELETS 10*3/mm3 170     Results from last 7 days   Lab Units 20  0528   SODIUM mmol/L 139   POTASSIUM mmol/L 4.4   CHLORIDE mmol/L 101   CO2 mmol/L 24.3   BUN mg/dL 14   CREATININE mg/dL 0.85   GLUCOSE mg/dL 121*   Estimated Creatinine Clearance: 141.6 mL/min (by C-G formula based on SCr of 0.85 mg/dL).  Results from last 7 days   Lab Units 20  0528   CALCIUM mg/dL 8.4*       Assessment/Plan           Active Hospital Problems    Diagnosis  POA   • Venous thoracic outlet syndrome of left subclavian vein [I87.1]  Yes      Resolved Hospital Problems   No resolved problems to display.        Assessment & Plan  34 y.o. male postoperative day #2 status post left first rib resection.  Chest x-ray clear with slight atelectasis.          Diaphragm normal.  Discontinue IV fluids today.  Mobilize.  Physical therapy for range of motion with the left shoulder.        Dejuan White MD  02/19/20   @NOW@  Office Number [phone]

## 2020-02-19 NOTE — PLAN OF CARE
Problem: Patient Care Overview  Goal: Plan of Care Review  Outcome: Ongoing (interventions implemented as appropriate)  Flowsheets  Taken 2/19/2020 1729 by Pema Choudhury, RN  Progress: improving  Outcome Summary: VSS, pain controlled, patient was not using Morphine PCA so requested for it to be disconnected, minimal drainage from SHERIF, to start back on Eliquis this evening

## 2020-02-20 VITALS
DIASTOLIC BLOOD PRESSURE: 83 MMHG | SYSTOLIC BLOOD PRESSURE: 130 MMHG | OXYGEN SATURATION: 96 % | RESPIRATION RATE: 16 BRPM | WEIGHT: 178.5 LBS | HEART RATE: 73 BPM | HEIGHT: 76 IN | BODY MASS INDEX: 21.74 KG/M2 | TEMPERATURE: 98.1 F

## 2020-02-20 RX ORDER — IBUPROFEN 800 MG/1
800 TABLET ORAL EVERY 8 HOURS SCHEDULED
Qty: 50 TABLET | Refills: 0 | Status: SHIPPED | OUTPATIENT
Start: 2020-02-20

## 2020-02-20 RX ORDER — OXYCODONE HYDROCHLORIDE AND ACETAMINOPHEN 5; 325 MG/1; MG/1
1 TABLET ORAL EVERY 4 HOURS PRN
Qty: 40 TABLET | Refills: 0 | Status: SHIPPED | OUTPATIENT
Start: 2020-02-20 | End: 2020-02-27

## 2020-02-20 RX ORDER — CYCLOBENZAPRINE HCL 10 MG
10 TABLET ORAL 3 TIMES DAILY
Qty: 50 TABLET | Refills: 0 | Status: SHIPPED | OUTPATIENT
Start: 2020-02-20

## 2020-02-20 RX ADMIN — APIXABAN 5 MG: 5 TABLET, FILM COATED ORAL at 08:35

## 2020-02-20 RX ADMIN — OXYCODONE HYDROCHLORIDE AND ACETAMINOPHEN 1 TABLET: 5; 325 TABLET ORAL at 08:35

## 2020-02-20 RX ADMIN — OXYCODONE HYDROCHLORIDE AND ACETAMINOPHEN 1 TABLET: 5; 325 TABLET ORAL at 04:18

## 2020-02-20 RX ADMIN — PROPRANOLOL HYDROCHLORIDE 10 MG: 10 TABLET ORAL at 08:35

## 2020-02-20 RX ADMIN — CYCLOBENZAPRINE 10 MG: 10 TABLET, FILM COATED ORAL at 08:35

## 2020-02-20 RX ADMIN — IBUPROFEN 800 MG: 800 TABLET, FILM COATED ORAL at 05:58

## 2020-02-20 RX ADMIN — OXYCODONE HYDROCHLORIDE AND ACETAMINOPHEN 1 TABLET: 5; 325 TABLET ORAL at 00:12

## 2020-02-20 NOTE — DISCHARGE INSTRUCTIONS
No driving until follow-up with Dr. White. (2 weeks)  No lifting, pushing, pulling anything over 10 lbs until after follow up.  May shower. Let soap and water naturally fall over incisions.  No submerging in bathtub, pool, or hot tub.

## 2020-02-20 NOTE — PLAN OF CARE
Problem: Patient Care Overview  Goal: Plan of Care Review  Outcome: Ongoing (interventions implemented as appropriate)  Flowsheets (Taken 2/20/2020 0518)  Plan of Care Reviewed With: patient  Outcome Summary: Pt no longer using/requiring PCA, removed and taken down. Medicated Q4 with Percocet with good results. Pt rested well through night. Left upper chest incisions CDI, open to air and soft. SHERIF with scant amount of drainage, 5 cc for shift. Eliquis started. Hopeful d/c SHERIF and d/c home today. Will continue to monitor.  Goal: Individualization and Mutuality  Outcome: Ongoing (interventions implemented as appropriate)  Goal: Discharge Needs Assessment  Outcome: Ongoing (interventions implemented as appropriate)  Goal: Interprofessional Rounds/Family Conf  Outcome: Ongoing (interventions implemented as appropriate)     Problem: Pain, Acute (Adult)  Goal: Identify Related Risk Factors and Signs and Symptoms  Outcome: Ongoing (interventions implemented as appropriate)  Goal: Acceptable Pain Control/Comfort Level  Outcome: Ongoing (interventions implemented as appropriate)  Flowsheets (Taken 2/20/2020 0518)  Acceptable Pain Control/Comfort Level: making progress toward outcome     Problem: Breathing Pattern Ineffective (Adult)  Goal: Identify Related Risk Factors and Signs and Symptoms  Outcome: Ongoing (interventions implemented as appropriate)  Goal: Effective Oxygenation/Ventilation  Outcome: Ongoing (interventions implemented as appropriate)  Flowsheets (Taken 2/20/2020 0518)  Effective Oxygenation/Ventilation: making progress toward outcome  Goal: Anxiety/Fear Reduction  Outcome: Ongoing (interventions implemented as appropriate)     Problem: Fall Risk (Adult)  Goal: Identify Related Risk Factors and Signs and Symptoms  Outcome: Ongoing (interventions implemented as appropriate)  Goal: Absence of Fall  Outcome: Ongoing (interventions implemented as appropriate)  Flowsheets (Taken 2/20/2020 0518)  Absence of Fall:  making progress toward outcome

## 2020-02-20 NOTE — PROGRESS NOTES
Name: Rad Baltazar ADMIT: 2020   : 1985  PCP: Dar Garcia MD    MRN: [id number] LOS: 3 days   AGE/SEX: 34 y.o. male  ROOM: [address]    Active Hospital Problems    Diagnosis  POA   • Venous thoracic outlet syndrome of left subclavian vein [I87.1]  Yes      Resolved Hospital Problems   No resolved problems to display.     Subjective     34 y.o. male status post left first rib resection for venous thoracic outlet syndrome.  No shortness of breath.  Having the appropriate postoperative pain-PCA discontinued last night.    Review of Systems  As above  Objective     Vital Signs  Temp:  [97.6 °F (36.4 °C)-98.5 °F (36.9 °C)] 98.1 °F (36.7 °C)  Heart Rate:  [67-75] 73  Resp:  [16] 16  BP: (123-130)/(67-83) 130/83    Physical Exam  General: Alert and oriented  CV: Regular rate and rhythm  Respiratory: Unlabored breathing on room oxygen  GI: Abdomen is soft, nontender  Extremities: Incisions clean, dry, intact.  No hematoma.  SHERIF with 8 mL out overnight, discontinued.   Left hand neurologically intact.      Results Review:       I reviewed the patient's new clinical results.  Results from last 7 days   Lab Units 20  0528   WBC 10*3/mm3 9.62   HEMOGLOBIN g/dL 14.0   PLATELETS 10*3/mm3 170     Results from last 7 days   Lab Units 20  0528   SODIUM mmol/L 139   POTASSIUM mmol/L 4.4   CHLORIDE mmol/L 101   CO2 mmol/L 24.3   BUN mg/dL 14   CREATININE mg/dL 0.85   GLUCOSE mg/dL 121*   Estimated Creatinine Clearance: 141.6 mL/min (by C-G formula based on SCr of 0.85 mg/dL).  Results from last 7 days   Lab Units 20  0528   CALCIUM mg/dL 8.4*       Assessment/Plan           Active Hospital Problems    Diagnosis  POA   • Venous thoracic outlet syndrome of left subclavian vein [I87.1]  Yes      Resolved Hospital Problems   No resolved problems to display.        Assessment & Plan  34 y.o. male postoperative day #3 status post left first rib resection.  Recovering well.  Started  Eliquis last night for history of left subclavian DVT.  Has done well on oral pain medication only.  SHERIF has been discontinued.  Using incentive spirometer well.    Physical therapy for range of motion with the left shoulder-we will need outpatient physical therapy to start no later than 2/24/2020.  Plan for discharge today.      Dejuan White MD  02/20/20   @NOW@  Office Number [phone]

## 2020-02-20 NOTE — PROGRESS NOTES
Case Management Discharge Note      Final Note: Pt discharged home  with plans for outpt PT.  Pt wants [facility] out[facility] PT.  Spoke with appointment desk who state they are unsure when pt can be seen but will get him in early next week as soon as possible.  JORDAN Mckenzie RN    Provided post acute provider list?: Yes  Post Acute Provider List: Outpatient Therapy  Delivered To: Patient  Method of Delivery: In person    Destination      No service has been selected for the patient.      Durable Medical Equipment      No service has been selected for the patient.      Dialysis/Infusion      No service has been selected for the patient.      Home Medical Care      No service has been selected for the patient.      Therapy      No service has been selected for the patient.      Community Resources      No service has been selected for the patient.        Transportation Services  Private: Car    Final Discharge Disposition Code: 01 - home or self-care

## 2020-02-24 ENCOUNTER — HOSPITAL ENCOUNTER (OUTPATIENT)
Dept: PHYSICAL THERAPY | Facility: HOSPITAL | Age: 35
Setting detail: THERAPIES SERIES
Discharge: HOME | End: 2020-02-24

## 2020-02-24 DIAGNOSIS — G54.0 THORACIC OUTLET SYNDROME: Primary | ICD-10-CM

## 2020-02-24 DIAGNOSIS — I82.622 ACUTE DEEP VEIN THROMBOSIS (DVT) OF OTHER VEIN OF LEFT UPPER EXTREMITY (HCC): ICD-10-CM

## 2020-02-24 DIAGNOSIS — G89.29 CHRONIC PAIN OF LEFT UPPER EXTREMITY: ICD-10-CM

## 2020-02-24 DIAGNOSIS — Z47.89 ORTHOPEDIC AFTERCARE: ICD-10-CM

## 2020-02-24 DIAGNOSIS — M79.602 CHRONIC PAIN OF LEFT UPPER EXTREMITY: ICD-10-CM

## 2020-02-24 DIAGNOSIS — I87.1 VENOUS THORACIC OUTLET SYNDROME OF LEFT SUBCLAVIAN VEIN: ICD-10-CM

## 2020-02-24 PROCEDURE — 97161 PT EVAL LOW COMPLEX 20 MIN: CPT | Performed by: PHYSICAL THERAPIST

## 2020-02-24 PROCEDURE — 97110 THERAPEUTIC EXERCISES: CPT | Performed by: PHYSICAL THERAPIST

## 2020-02-24 PROCEDURE — 97162 PT EVAL MOD COMPLEX 30 MIN: CPT | Performed by: PHYSICAL THERAPIST

## 2020-02-24 NOTE — THERAPY EVALUATION
Outpatient Physical Therapy Ortho Initial Evaluation  [facility]     Patient Name: Rad Baltazar  : 1985  MRN: [id number]  Today's Date: 2020      Visit Date: 2020    Patient Active Problem List   Diagnosis   • Venous thoracic outlet syndrome of left subclavian vein        Past Medical History:   Diagnosis Date   • Arm vein blood clot, left         Past Surgical History:   Procedure Laterality Date   • FIRST RIB RESECTION Left 2020    Procedure: LEFT FIRST RIB RESECTION;  Surgeon: Dejuan White MD;  Location: [facility] OR;  Service: Vascular;  Laterality: Left;   • VENOGRAM PERIPHERAL Left 2019    Procedure: LEFT ARM VENOGRAM AND THROMOLYSIS;  Surgeon: Dejuan White MD;  Location: [facility] OR ;  Service: Vascular   • WISDOM TOOTH EXTRACTION         Visit Dx:     ICD-10-CM ICD-9-CM   1. Thoracic outlet syndrome G54.0 353.0   2. Chronic pain of left upper extremity M79.602 729.5    G89.29 338.29   3. Acute deep vein thrombosis (DVT) of other vein of left upper extremity (CMS/[facility]) I82.622 453.82   4. Orthopedic aftercare Z47.89 V54.9   5. Venous thoracic outlet syndrome of left subclavian vein I87.1 459.2         Patient History     Row Name 20 1100             History    Chief Complaint  Difficulty with daily activities;Fatigue/poor endurance;Joint swelling;Muscle weakness;Muscle tenderness;Pain;Swelling  -CJ      Type of Pain  Upper Extremity / Arm;Shoulder pain;Chest pain  -CJ      Brief Description of Current Complaint  I awoke one day back in November with my L arm swollen. I didnt do anything different the few days before. I eventually went to the MD and was diagnosed with a blood clot. I had surgery in December to put a balloon in to open the vein but it didnt relieve the pressure. I had to have surgery again on 20 to remove the scalenes and first rib. I cannot work, raise my arm overhead, lay on the L side. I still have cold  symptoms and color changes in my hands.   -CJ      Previous treatment for THIS PROBLEM  Surgery;Medication  -CJ      Surgery Date:  02/17/20  -CJ      Patient/Caregiver Goals  Relieve pain;Return to prior level of function;Know what to do to help the symptoms;Improve mobility;Return to work  -CJ      Current Tobacco Use  none  -CJ      Patient's Rating of General Health  Good  -CJ      Hand Dominance  right-handed  -CJ      What clinical tests have you had for this problem?  MRI;Other 1 (comment) ultrasound  -CJ      Results of Clinical Tests  DVT subclavian vein  -CJ         Pain     Pain Location  Arm;Chest;Shoulder  -CJ      Pain at Present  6  -CJ      Pain at Best  4  -CJ      Pain at Worst  9  -CJ      Pain Frequency  Constant/continuous  -CJ      Pain Description  Aching;Throbbing;Heaviness;Other (Comment) cold  -CJ      What Performance Factors Make the Current Problem(s) WORSE?  raising arm overhead  -CJ      Is your sleep disturbed?  Yes  -CJ      Difficulties at work?  off work at this time;   -CJ      Difficulties with ADL's?  yes, all  -CJ      Difficulties with recreational activities?  yes, unable to work out at this time  -CJ         Fall Risk Assessment    Any falls in the past year:  No  -CJ         Services    Prior Rehab/Home Health Experiences  No  -CJ         Daily Activities    Primary Language  English  -CJ      How does patient learn best?  Demonstration  -CJ      Teaching needs identified  Home Exercise Program;Management of Condition  -CJ      Patient is concerned about/has problems with  Difficulty with self care (i.e. bathing, dressing, toileting:;Flexibility;Grasping objects lifting;Reaching over head;Repetitive movements of the hand, arm, shoulder  -CJ      Does patient have problems with the following?  None  -CJ      Barriers to learning  None  -CJ      Pt Participated in POC and Goals  Yes  -CJ         Safety    Are you being hurt, hit, or frightened by anyone at home or  in your life?  No  -CJ      Are you being neglected by a caregiver  No  -CJ        User Key  (r) = Recorded By, (t) = Taken By, (c) = Cosigned By    Initials Name Provider Type    Roque Priest PT Physical Therapist          PT Ortho     Row Name 02/24/20 1200       Posture/Observations    Alignment Options  Rounded shoulders;Scapular winging  -CJ    Rounded Shoulders  Moderate  -CJ    Scapular winging  Moderate  -CJ    Observations  Incision healing;Edema;Erythema  -CJ       Quarter Clearing    Quarter Clearing  Upper Quarter Clearing  -CJ       Sensory Screen for Light Touch- Upper Quarter Clearing    C4 (posterior shoulder)  Bilateral:;Intact  -CJ    C5 (lateral upper arm)  Bilateral:;Intact  -CJ    C6 (tip of thumb)  Bilateral:;Intact  -CJ    C7 (tip of 3rd finger)  Bilateral:;Intact  -CJ    C8 (tip of 5th finger)  Bilateral:;Intact  -CJ    T1 (medial lower arm)  Bilateral:;Intact  -CJ       Myotomal Screen- Upper Quarter Clearing    Shoulder flexion (C5)  Bilateral:;5 (Normal)  -CJ    Elbow flexion/wrist extension (C6)  Right:;5 (Normal);Left:;4+ (Good +)  -CJ    Elbow extension/wrist flexion (C7)  Right:;5 (Normal);Left:;4+ (Good +)  -CJ       Cervical/Shoulder ROM Screen    Cervical flexion  Impaired 30 deg  -CJ    Cervical extension  Impaired 15 deg  -CJ    Cervical lateral flexion  Impaired RSB:11 LSB 30  -CJ    Cervical rotation  Impaired B rotation: 35  -CJ       Cervical Palpation    Cervical Palpation- Location?  Scalenes;Upper traps;Levator scapula;SCM  -CJ    SCM  Left:;Swollen  -CJ    Scalenes  Left:;Tender  -CJ    Levator Scapula  Left:;Tender;Swollen  -CJ    Upper Traps  Left:;Tender;Swollen  -CJ       General ROM    GENERAL ROM COMMENTS  RUE full AROM all planes  -CJ       Left Upper Ext    Lt Shoulder Abduction AROM  85  -CJ    Lt Shoulder Flexion AROM  110  -CJ    Lt Shoulder External Rotation AROM  WNL  -CJ    Lt Shoulder Internal Rotation AROM  WNL  -CJ       MMT (Manual Muscle Testing)     General MMT Comments  RUE 5/5 all planes  -CJ       MMT Left Upper Ext    Lt Shoulder Flexion MMT, Gross Movement  (4/5) good  -CJ    Lt Shoulder ABduction MMT, Gross Movement  (3-/5) fair minus  -CJ    Lt Shoulder Internal Rotation MMT, Gross Movement  (4/5) good  -CJ    Lt Shoulder External Rotation MMT, Gross Movement  (4/5) good  -CJ        Strength Right    # Reps  2  -CJ    Right Rung  2  -CJ    Right  Test 1  120  -CJ    Right  Test 2  120  -CJ     Strength Average Right  120  -CJ        Strength Left    # Reps  2  -CJ    Left Rung  2  -CJ    Left  Test 1  110  -CJ    Left  Test 2  115  -CJ     Strength Average Left  112.5  -CJ       Flexibility    Flexibility Tested?  Upper Extremity  -CJ       Upper Extremity Flexibility    SCM  Left:;Moderately limited  -CJ    Upper Trapezius  Left:;Moderately limited  -CJ    Pect Minor  Bilateral:;Moderately limited  -CJ      User Key  (r) = Recorded By, (t) = Taken By, (c) = Cosigned By    Initials Name Provider Type    CJ Roque Garcia, PT Physical Therapist                      Therapy Education  Education Details: Education on use of ice to address swelling in thoracic outlet area; HEP initiated. Educated on activities to avoid at the gym (pectoral strengthening/closures, kettle bells, cycling; instead, perform scapular strengthening once release to return to gym)  Given: HEP, Symptoms/condition management, Pain management  Program: New  How Provided: Verbal, Demonstration, Written  Provided to: Patient  Level of Understanding: Teach back education performed, Verbalized     PT OP Goals     Row Name 02/24/20 1300          PT Short Term Goals    STG 1  Patient will be independent and compliant with initial home exercise program.  -     STG 2  Patient will be independent with education for symptom management, joint protection and strategies to minimize stress on affected tissues  -        Long Term Goals    LTG 1  Patient will be  independent and compliant with advanced home exercise program to facilitate self-management of symptoms  -     LTG 2  Pt. will report L shoulder pain </= 1-2/10 with all daily activities and sleeping.  -     LTG 3  Patient will be able to roll on to and sleep on L side without waking from increased pain or symptoms.   -     LTG 4  Patient will score </= 15/100 on QuickDASH score, indicating decreased perceived functional disability  -     LTG 5  Pt. will increase L shoulder AROM flexion and abduction to at least 0-160 deg with minimal substitution to increase ease of bathing, grooming, and dressing.  -     LTG 6  Patient will score </= 10% on Neck Disability Index, indicating improved perceived functional ability with daily activities.  -       User Key  (r) = Recorded By, (t) = Taken By, (c) = Cosigned By    Initials Name Provider Type    Roque Priest, PT Physical Therapist          PT Assessment/Plan     Row Name 02/24/20 3724          PT Assessment    Functional Limitations  Limitations in functional capacity and performance;Performance in sport activities;Performance in self-care ADL;Performance in work activities;Performance in leisure activities;Limitations in community activities;Limitation in home management  -     Impairments  Impaired flexibility;Peripheral nerve integrity;Posture;Range of motion;Pain;Muscle strength;Impaired muscle endurance;Impaired muscle length  -     Assessment Comments  Rad Baltazar is a 34 y.o. 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 He presents with a evolving clinical presentation.  He has comorbidities of excessive gym activities/lifting but no significant personal factors that should affect his progress in the plan of care. Self scored disability measure of QuickDash and NDI was a  59 and 4%  respectively. He demonstrated impaired cervical AROM, decreased LUE AROM, decreased LUE MMT strength, and observable swelling and warmth over L thoracic outlet. Incisions free of signs of infection at this time.  Signs and symptoms are consistent with physical therapy diagnosis of thoracic outlet syndrome with orthopedic aftercare. He is appropriate for skilled therapy services at this time to address deficits and improve ease with functional ADLs and use of LUE.  -     Please refer to paper survey for additional self-reported information  Yes  -CJ     Rehab Potential  Good  -CJ     Patient/caregiver participated in establishment of treatment plan and goals  Yes  -CJ     Patient would benefit from skilled therapy intervention  Yes  -CJ        PT Plan    PT Frequency  2x/week  -     Predicted Duration of Therapy Intervention (Therapy Eval)  12 weeks  -     Planned CPT's?  PT RE-EVAL: 42955;PT THER PROC EA 15 MIN: 22068;PT THER ACT EA 15 MIN: 98392;PT MANUAL THERAPY EA 15 MIN: 66943;PT NEUROMUSC RE-EDUCATION EA 15 MIN: 92648;PT SELF CARE/HOME MGMT/TRAIN EA 15: 33166;PT HOT OR COLD PACK TREAT MCARE;PT ELECTRICAL STIM UNATTEND: ;PT EVAL MOD COMPLELITY: 24103  -     PT Plan Comments  UBE, thoracic towel/noodle stretching, scapular strengthening. manual to L thoracic tissues as edema improves  -       User Key  (r) = Recorded By, (t) = Taken By, (c) = Cosigned By    Initials Name Provider Type     Roque Garcia, PT Physical Therapist            OP Exercises     Row Name 02/24/20 1300             Total Minutes    31618 - PT Therapeutic Exercise Minutes  20  -         Exercise 1    Exercise Name 1  Thoracic towel roll stretch  -      Time 1  deep breathing 10x with thoracic cavity expansion  -         Exercise 2    Exercise Name 2  Serratus punches over thoracic roll  -      Reps 2  15, #2  -         Exercise 3    Exercise Name 3  Pectoral stretch, supine over towel roll  -      Time 3  3 min   -CJ      Additional Comments  palms up  -CJ         Exercise 4    Exercise Name 4  B shldr ER, RTB over towel roll  -CJ      Reps 4  15  -CJ         Exercise 5    Exercise Name 5  Sidelying thoracic rotation  -CJ      Reps 5  10  -CJ      Time 5  5 sec  -CJ      Additional Comments  laying on R only; arms bent  -CJ         Exercise 6    Exercise Name 6  supine chin tucks  -CJ      Reps 6  10  -CJ      Time 6  5 sec  -CJ         Exercise 7    Exercise Name 7  Seated upper trap stretch  -CJ      Reps 7  2  -CJ      Time 7  15 sec  -CJ        User Key  (r) = Recorded By, (t) = Taken By, (c) = Cosigned By    Initials Name Provider Type    Roque Priest, PT Physical Therapist                        Outcome Measure Options: Quick DASH, Neck Disability Index (NDI)  Quick DASH  Open a tight or new jar.: Moderate Difficulty  Do heavy household chores (e.g., wash walls, wash floors): Unable  Carry a shopping bag or briefcase: Moderate Difficulty  Wash your back: Unable  Use a knife to cut food: Mild Difficulty  Recreational activities in which you take some force or impact through your arm, should or hand (e.g. golf, hammering, tennis, etc.): Unable  During the past week, to what extent has your arm, shoulder, or hand problem interfered with your normal social activites with family, friends, neighbors or groups?: Slightly  During the past week, were you limited in your work or other regular daily activities as a result of your arm, shoulder or hand problem?: Unable  Arm, Shoulder, or hand pain: Moderate  Tingling (pins and needles) in your arm, shoulder, or hand: Mild  During the past week, how much difficulty have you had sleeping because of the pain in your arm, shoulder or hand?: Mild Difficulty  Number of Questions Answered: 11  Quick DASH Score: 59.09  Neck Disability Index  Section 1 - Pain Intensity: The pain is moderate at the moment.  Section 2 - Personal Care: I can look after myself normally, but it causes  extra pain.  Section 3 - Lifting: Pain prevents me from lifting heavy weights, but I can manage light weights if they are conveniently positioned.  Section 4 - Work: I can't do my usual work.  Section 5 - Headaches: I have slight headaches that come infrequently.  Section 6 - Concentration: I can concentrate fully with slight difficulty.  Section 7 - Sleeping: My sleep is slightly disturbed for less than 1 hour.  Section 8 - Driving: I can drive as long as I want with moderate neck pain.  Section 9 - Reading: I can read as much as I want with slight neck pain.  Section 10 - Recreation: I have some neck pain with a few recreational activities.  Neck Disability Index Score: 17  Neck Disability Index Comments: 34      Time Calculation:     Start Time: 1045  Stop Time: 1130  Time Calculation (min): 45 min  Total Timed Code Minutes- PT: 20 minute(s)     Therapy Charges for Today     Code Description Service Date Service Provider Modifiers Qty    62497385166  PT THER PROC EA 15 MIN 2/24/2020 Roque Garcia, PT GP 1    07935393783 HC PT EVAL MOD COMPLEXITY 2 2/24/2020 Roque Garcia, PT GP 1          PT G-Codes  Outcome Measure Options: Quick DASH, Neck Disability Index (NDI)  Quick DASH Score: 59.09  Neck Disability Index Score: 17         Roque Garcia PT  2/24/2020

## 2020-02-25 NOTE — DISCHARGE SUMMARY
"  Name: Rad Baltazar ADMIT: 2020   : 1985  PCP: Dar Garcia MD    MRN: [id number] LOS: 3 days   AGE/SEX: 34 y.o. male  Location: [facility]     Date of Admission: 2020  Date of Discharge:  2020    PCP: Dar Garcia MD      DISCHARGE DIAGNOSIS  Active Hospital Problems    Diagnosis  POA   • Venous thoracic outlet syndrome of left subclavian vein [I87.1]  Yes      Resolved Hospital Problems   No resolved problems to display.       SECONDARY DIAGNOSES  Past Medical History:   Diagnosis Date   • Arm vein blood clot, left        CONSULTS   Consults     No orders found from 2020 to 2020.          PROCEDURES PERFORMED    Date: 2020    HOSPITAL COURSE  Patient is a 34 y.o. male presented to [facility] admitted for symptomatic left venous thoracic outlet syndrome.  He underwent a left first rib resection on 2020.  Postoperatively he was returned to the vascular surgery unit.  His postoperative course was benign.  He tolerated a diet immediately.  Physical therapy was begun on postop day #1.  Please see the admitting history and physical for further details.  Over the next few days his PCA was weaned off and he transitioned to oral pain medication.  His SHERIF drain was discontinued.  He used incentive spirometry well and mobilize well.  He was discharged in stable condition on 2020 with normal vital signs.  His incisions were clean, dry, intact.  His arm was not swollen at the time of discharge      VITAL SIGNS  /83 (BP Location: Right arm, Patient Position: Lying)   Pulse 73   Temp 98.1 °F (36.7 °C) (Oral)   Resp 16   Ht 193 cm (76\")   Wt 81 kg (178 lb 8 oz)   SpO2 96%   BMI 21.73 kg/m²   Objective  CONDITION ON DISCHARGE  Stable.      DISCHARGE DISPOSITION   Home or Self Care      DISCHARGE MEDICATIONS     Discharge Medications      New Medications      Instructions Start Date   cyclobenzaprine 10 MG tablet  Commonly known as:  FLEXERIL   10 " mg, Oral, 3 Times Daily      ibuprofen 800 MG tablet  Commonly known as:  ADVIL,MOTRIN   800 mg, Oral, Every 8 Hours Scheduled      oxyCODONE-acetaminophen 5-325 MG per tablet  Commonly known as:  PERCOCET   1 tablet, Oral, Every 4 Hours PRN         Continue These Medications      Instructions Start Date   apixaban 5 MG tablet tablet  Commonly known as:  ELIQUIS   5 mg, Oral, 2 Times Daily, TO HOLD 2 DAYS PRIOR TO SURGERY PER DR. HOOPER'S INSTRUCTIONS      HIBICLENS EX   1 application, Apply externally, Take As Directed      melatonin 5 MG tablet tablet   7.5 mg, Oral, Nightly      MULTIVITAMIN ADULT PO   1 tablet, Oral, Daily, HOLD FOR SURGERY      propranolol 10 MG tablet  Commonly known as:  INDERAL   10 mg, Oral, Daily      traZODone 50 MG tablet  Commonly known as:  DESYREL   50 mg, Oral, As Needed              Future Appointments   Date Time Provider Department Center   2/28/2020 12:30 PM Roque Garcia PT [facility]   3/2/2020  3:30 PM Roque Garcia, PT [facility]   3/5/2020  1:00 PM Roque Garcia, PT [facility]   3/11/2020  9:15 AM Roque Garcia, PT [facility]   3/13/2020  2:45 PM Roque Garcia, PT [facility]   3/16/2020  2:45 PM Roque Garcia, PT [facility]   3/20/2020 10:45 AM Roque Garcia, PT [facility]   3/23/2020  2:45 PM Roque Garcia, PT [facility]   3/27/2020  2:00 PM Roque Garcia, PT [facility]   3/30/2020  2:00 PM Roque Garcia, PT [facility]   4/1/2020  2:00 PM Roque Garcia, PT [facility]     Additional Instructions for the Follow-ups that You Need to Schedule     Ambulatory Referral to Physical Therapy POST OP   As directed      Needs to begin physical therapy for left shoulder range of motion exercises no later than 2/24/2020 postoperative care after first rib resection    Order Comments:  Needs to begin physical therapy for left shoulder range of motion exercises no later than 2/24/2020 postoperative care after first rib resection        Specialty needed:  POST OP         Discharge Follow-up with PCP   As directed       Currently Documented PCP:    Dar Garcia MD    PCP Phone Number:    [phone]     Follow Up Details:  Follow-up with PCP in 8 Weeks for Initiation of Statin Therapy         Discharge Follow-up with Specified Provider: Call Dr. White's office for an appointment in 2-3 weeks.; 2 Weeks   As directed      To:  Call Dr. White's office for an appointment in 2-3 weeks.    Follow Up:  2 Weeks           Follow-up Information     Dar Garcia MD .    Specialty:  Family Medicine  Why:  Follow-up with PCP in 8 Weeks for Initiation of Statin Therapy  Contact information:  [address]  [place] [id number]  [phone]             Dejuan White MD. Go on 3/10/2020.    Specialty:  Vascular Surgery  Why:  8:45 am  Contact information:  [address] [facility] [address]  [place] [address]  [phone]             Dar Garcia MD .    Specialty:  Family Medicine  Contact information:  [address]  [place] [id number]  [phone]                   TEST  RESULTS PENDING AT DISCHARGE       [place], [facility]    Dejuan White MD  Office Number [phone]    20  1:18 PM

## 2020-02-28 ENCOUNTER — HOSPITAL ENCOUNTER (OUTPATIENT)
Dept: PHYSICAL THERAPY | Facility: HOSPITAL | Age: 35
Setting detail: THERAPIES SERIES
Discharge: HOME | End: 2020-02-28

## 2020-02-28 DIAGNOSIS — Z47.89 ORTHOPEDIC AFTERCARE: ICD-10-CM

## 2020-02-28 DIAGNOSIS — M79.602 CHRONIC PAIN OF LEFT UPPER EXTREMITY: ICD-10-CM

## 2020-02-28 DIAGNOSIS — I87.1 VENOUS THORACIC OUTLET SYNDROME OF LEFT SUBCLAVIAN VEIN: ICD-10-CM

## 2020-02-28 DIAGNOSIS — I82.622 ACUTE DEEP VEIN THROMBOSIS (DVT) OF OTHER VEIN OF LEFT UPPER EXTREMITY (HCC): ICD-10-CM

## 2020-02-28 DIAGNOSIS — G89.29 CHRONIC PAIN OF LEFT UPPER EXTREMITY: ICD-10-CM

## 2020-02-28 DIAGNOSIS — G54.0 THORACIC OUTLET SYNDROME: Primary | ICD-10-CM

## 2020-02-28 PROCEDURE — 97140 MANUAL THERAPY 1/> REGIONS: CPT | Performed by: PHYSICAL THERAPIST

## 2020-02-28 PROCEDURE — 97110 THERAPEUTIC EXERCISES: CPT | Performed by: PHYSICAL THERAPIST

## 2020-02-28 NOTE — THERAPY TREATMENT NOTE
Outpatient Physical Therapy Ortho Treatment Note  [facility]     Patient Name: Rad Baltazar  : 1985  MRN: [id number]  Today's Date: 2020      Visit Date: 2020    Visit Dx:    ICD-10-CM ICD-9-CM   1. Thoracic outlet syndrome G54.0 353.0   2. Chronic pain of left upper extremity M79.602 729.5    G89.29 338.29   3. Acute deep vein thrombosis (DVT) of other vein of left upper extremity (CMS/[facility]) I82.622 453.82   4. Orthopedic aftercare Z47.89 V54.9   5. Venous thoracic outlet syndrome of left subclavian vein I87.1 459.2       Patient Active Problem List   Diagnosis   • Venous thoracic outlet syndrome of left subclavian vein        Past Medical History:   Diagnosis Date   • Arm vein blood clot, left         Past Surgical History:   Procedure Laterality Date   • FIRST RIB RESECTION Left 2020    Procedure: LEFT FIRST RIB RESECTION;  Surgeon: Dejuan White MD;  Location: [facility];  Service: Vascular;  Laterality: Left;   • VENOGRAM PERIPHERAL Left 2019    Procedure: LEFT ARM VENOGRAM AND THROMOLYSIS;  Surgeon: Dejuan White MD;  Location: [facility] OR ;  Service: Vascular   • WISDOM TOOTH EXTRACTION                         PT Assessment/Plan     Row Name 20 1532          PT Assessment    Assessment Comments  First treatment session since initial evaluation. Reporting improvement in pain/symptoms and compliance with HEP earlier in the week. Added new ROM and light strengthening exercises. Educated on activities to avoid at gym upon completion of therapy activities.   -        PT Plan    PT Plan Comments  noodle stretching  -       User Key  (r) = Recorded By, (t) = Taken By, (c) = Cosigned By    Initials Name Provider Type    Roque Priest, PT Physical Therapist          Modalities     Row Name 20 1200             Ice    Ice Applied  Yes  -FRANCO      Location  L scapular/up and over to shoulder and thoracic outlet  -      Rx  "Minutes  12 mins  -      Ice S/P Rx  Yes  -        User Key  (r) = Recorded By, (t) = Taken By, (c) = Cosigned By    Initials Name Provider Type     Roque Garcia, PT Physical Therapist        OP Exercises     Row Name 02/28/20 1500 02/28/20 1200          Subjective Comments    Subjective Comments  --  I have a little better everyday. The MD wanted me off work for three weeks so I have to be home another two weeks.   -        Subjective Pain    Able to rate subjective pain?  --  yes  -     Pre-Treatment Pain Level  --  3  -CJ     Subjective Pain Comment  --  6/10 before AM medicine  -        Total Minutes    38323 - PT Therapeutic Exercise Minutes  --  30  -CJ     39017 - PT Manual Therapy Minutes  15  -CJ  --        Exercise 1    Exercise Name 1  --  Thoracic towel roll stretch  -     Time 1  --  deep breathing 10x with thoracic cavity expansion  -        Exercise 2    Exercise Name 2  --  Serratus punches over thoracic roll  -     Reps 2  --  20, #2  -CJ        Exercise 3    Exercise Name 3  --  Pectoral stretch, supine over towel roll  -     Reps 3  --  alternating arms in tolerated range x 15  -CJ     Time 3  --  3 min  -     Additional Comments  --  palms up  -        Exercise 4    Exercise Name 4  --  B shldr ER, RTB over towel roll  -     Reps 4  --  15  -CJ        Exercise 5    Exercise Name 5  --  Sidelying thoracic rotation  -     Reps 5  --  5 B  -CJ     Time 5  --  15 sec  -        Exercise 6    Exercise Name 6  --  supine chin tucks  -     Reps 6  --  10  -CJ     Time 6  --  5 sec  -CJ        Exercise 7    Exercise Name 7  --  Seated upper trap stretch  -     Reps 7  --  2  -CJ     Time 7  --  15 sec  -CJ     Additional Comments  --  with levator and SCM flossing   -        Exercise 8    Exercise Name 8  --  supine tricep \"skull crushers\"  -CJ     Reps 8  --  15, #3  -CJ        Exercise 9    Exercise Name 9  --  Post. shoulder rolls  -     Reps 9  --  3  -CJ     " Time 9  --  10  -CJ        Exercise 10    Exercise Name 10  --  Doorway stretch  -CJ     Reps 10  --  3  -CJ     Time 10  --  20 sec  -CJ     Additional Comments  --  upside down V  -        Exercise 11    Exercise Name 11  --  shoulder ext, RTB  -     Cueing 11  --  Verbal;Tactile  -CJ     Reps 11  --  20  -CJ       User Key  (r) = Recorded By, (t) = Taken By, (c) = Cosigned By    Initials Name Provider Type    Roque Priest, PT Physical Therapist                      Manual Rx (last 36 hours)      Manual Treatments     Row Name 02/28/20 1500             Total Minutes    49994 - PT Manual Therapy Minutes  15  -CJ         Manual Rx 1    Manual Rx 1 Location  R sidelying, hugging a pillow  -      Manual Rx 1 Type  STM to L scapular complex and L cervical tissues  -      Manual Rx 1 Grade  Significant tension noted in L upper trap and SCM  -        User Key  (r) = Recorded By, (t) = Taken By, (c) = Cosigned By    Initials Name Provider Type    Roque Priest, PT Physical Therapist          PT OP Goals     Row Name 02/28/20 1500          PT Short Term Goals    STG 1  Patient will be independent and compliant with initial home exercise program.  -     STG 1 Progress  Ongoing  -     STG 2  Patient will be independent with education for symptom management, joint protection and strategies to minimize stress on affected tissues  -     STG 2 Progress  Ongoing  -        Long Term Goals    LTG 1  Patient will be independent and compliant with advanced home exercise program to facilitate self-management of symptoms  -     LTG 1 Progress  Ongoing  -     LTG 2  Pt. will report L shoulder pain </= 1-2/10 with all daily activities and sleeping.  -     LTG 2 Progress  Ongoing  -     LTG 3  Patient will be able to roll on to and sleep on L side without waking from increased pain or symptoms.   -     LTG 3 Progress  Ongoing  -     LTG 4  Patient will score </= 15/100 on QuickDASH score, indicating  decreased perceived functional disability  -     LTG 4 Progress  Ongoing  -     LTG 5  Pt. will increase L shoulder AROM flexion and abduction to at least 0-160 deg with minimal substitution to increase ease of bathing, grooming, and dressing.  -     LTG 5 Progress  Ongoing  -     LTG 6  Patient will score </= 10% on Neck Disability Index, indicating improved perceived functional ability with daily activities.  -     LTG 6 Progress  Ongoing  -       User Key  (r) = Recorded By, (t) = Taken By, (c) = Cosigned By    Initials Name Provider Type    Roque Priest, PT Physical Therapist                         Time Calculation:   Start Time: 1230  Stop Time: 1330  Time Calculation (min): 60 min  Total Timed Code Minutes- PT: 45 minute(s)  Therapy Charges for Today     Code Description Service Date Service Provider Modifiers Qty    24498260097  PT THER PROC EA 15 MIN 2/28/2020 Roque Garcia, PT GP 2    [id number]  PT MANUAL THERAPY EA 15 MIN 2/28/2020 Roque Garcia, PT GP 1    [id number]  PT HOT OR COLD PACK TREAT MCARE 2/28/2020 Roque Garcia, PT GP 1                    Roque Garcia PT  2/28/2020

## 2020-03-02 ENCOUNTER — HOSPITAL ENCOUNTER (OUTPATIENT)
Dept: PHYSICAL THERAPY | Facility: HOSPITAL | Age: 35
Setting detail: THERAPIES SERIES
Discharge: HOME | End: 2020-03-02

## 2020-03-02 DIAGNOSIS — M79.602 CHRONIC PAIN OF LEFT UPPER EXTREMITY: ICD-10-CM

## 2020-03-02 DIAGNOSIS — Z47.89 ORTHOPEDIC AFTERCARE: ICD-10-CM

## 2020-03-02 DIAGNOSIS — G89.29 CHRONIC PAIN OF LEFT UPPER EXTREMITY: ICD-10-CM

## 2020-03-02 DIAGNOSIS — I82.622 ACUTE DEEP VEIN THROMBOSIS (DVT) OF OTHER VEIN OF LEFT UPPER EXTREMITY (HCC): ICD-10-CM

## 2020-03-02 DIAGNOSIS — G54.0 THORACIC OUTLET SYNDROME: Primary | ICD-10-CM

## 2020-03-02 DIAGNOSIS — I87.1 VENOUS THORACIC OUTLET SYNDROME OF LEFT SUBCLAVIAN VEIN: ICD-10-CM

## 2020-03-02 PROCEDURE — 97110 THERAPEUTIC EXERCISES: CPT | Performed by: PHYSICAL THERAPIST

## 2020-03-02 PROCEDURE — 97140 MANUAL THERAPY 1/> REGIONS: CPT | Performed by: PHYSICAL THERAPIST

## 2020-03-02 NOTE — THERAPY TREATMENT NOTE
Outpatient Physical Therapy Ortho Treatment Note  [facility]     Patient Name: Rad Baltazar  : 1985  MRN: [id number]  Today's Date: 3/2/2020      Visit Date: 2020    Visit Dx:    ICD-10-CM ICD-9-CM   1. Thoracic outlet syndrome G54.0 353.0   2. Chronic pain of left upper extremity M79.602 729.5    G89.29 338.29   3. Acute deep vein thrombosis (DVT) of other vein of left upper extremity (CMS/[facility]) I82.622 453.82   4. Orthopedic aftercare Z47.89 V54.9   5. Venous thoracic outlet syndrome of left subclavian vein I87.1 459.2       Patient Active Problem List   Diagnosis   • Venous thoracic outlet syndrome of left subclavian vein        Past Medical History:   Diagnosis Date   • Arm vein blood clot, left         Past Surgical History:   Procedure Laterality Date   • FIRST RIB RESECTION Left 2020    Procedure: LEFT FIRST RIB RESECTION;  Surgeon: Dejuan White MD;  Location: [facility] OR;  Service: Vascular;  Laterality: Left;   • VENOGRAM PERIPHERAL Left 2019    Procedure: LEFT ARM VENOGRAM AND THROMOLYSIS;  Surgeon: Dejuan White MD;  Location: [facility] OR ;  Service: Vascular   • WISDOM TOOTH EXTRACTION                         PT Assessment/Plan     Row Name 20 1642          PT Assessment    Assessment Comments  Continued with pectoral opening activities and gentle scapular strengthening. Added prone scapular exercises with max verbal and tactile cueing for proper scapular engagement without upper trap compensation. Education on not overperforming activities at home to prevent injury and also using textured towel to help with desensitization of pectoral nerves. He verbalized understanding for all. Decreased pain reported in L side of neck after manual therapy.   -FRANCO       User Key  (r) = Recorded By, (t) = Taken By, (c) = Cosigned By    Initials Name Provider Type    Roque Priest, PT Physical Therapist          Modalities     Row Name  03/02/20 1500             Ice    Ice Applied  Yes  -CJ      Location  L scapular/up and over to shoulder and thoracic outlet  -CJ      Rx Minutes  12 mins  -CJ      Ice S/P Rx  Yes  -CJ        User Key  (r) = Recorded By, (t) = Taken By, (c) = Cosigned By    Initials Name Provider Type     Roque Garcia S, PT Physical Therapist        OP Exercises     Row Name 03/02/20 1500             Subjective Comments    Subjective Comments  I was doing well then I tried to put a closet door back on its tracks and it was able to fall on me so I had to catch it. I think i strained a neck muscle. I have a lot of tingling and burning into my chest muscles in the AM.   -CJ         Subjective Pain    Able to rate subjective pain?  yes  -CJ      Pre-Treatment Pain Level  3  -      Subjective Pain Comment  7/10 without pain medicine  -         Total Minutes    70309 - PT Therapeutic Exercise Minutes  35  -CJ      74084 - PT Manual Therapy Minutes  15  -CJ         Exercise 1    Exercise Name 1  Thoracic stretch, blue noodle  -CJ      Time 1  deep breathing 10x with thoracic cavity expansion  -CJ         Exercise 2    Exercise Name 2  Serratus punches over thoracic roll  -CJ      Reps 2  20, #3  -CJ         Exercise 3    Exercise Name 3  Pectoral stretch, supine over blue noodle  -CJ      Reps 3  alternating arms in tolerated range x 15  -CJ      Time 3  3 min  -CJ         Exercise 4    Exercise Name 4  B shldr ER, RTB over towel roll  -CJ      Reps 4  15  -CJ         Exercise 5    Exercise Name 5  Sidelying thoracic rotation  -CJ      Reps 5  4 B  -CJ      Time 5  20 sec  -CJ         Exercise 6    Exercise Name 6  Lat Pulls, GTB  -CJ      Cueing 6  Tactile  -CJ      Reps 6  20  -CJ         Exercise 8    Exercise Name 8  ROWS, GTB  -CJ      Reps 8  15  -CJ         Exercise 9    Exercise Name 9  Post. shoulder rolls  -CJ      Reps 9  15  -CJ         Exercise 10    Exercise Name 10  Doorway stretch  -CJ      Reps 10  3  -CJ      Time  10  20 sec  -      Additional Comments  more into T position  -CJ         Exercise 11    Exercise Name 11  shoulder ext, GTB  -      Cueing 11  Verbal;Tactile  -CJ      Reps 11  20  -CJ      Time 11  2 sec hold  -CJ         Exercise 12    Exercise Name 12  Prone I's, T's (palms down and thumbs up)  -      Reps 12  15 ea direction  -      Additional Comments  added to HEP  -CJ        User Key  (r) = Recorded By, (t) = Taken By, (c) = Cosigned By    Initials Name Provider Type    Roque Priest, PT Physical Therapist                      Manual Rx (last 36 hours)      Manual Treatments     Row Name 03/02/20 1500             Total Minutes    82286 - PT Manual Therapy Minutes  15  -CJ         Manual Rx 1    Manual Rx 1 Location  R sidelying, hugging a pillow  -      Manual Rx 1 Type  STM to L scapular complex and L cervical tissues  -      Manual Rx 1 Grade  Significant tension noted in L upper trap and SCM  -        User Key  (r) = Recorded By, (t) = Taken By, (c) = Cosigned By    Initials Name Provider Type    Roque Priest, PT Physical Therapist          PT OP Goals     Row Name 03/02/20 1600          PT Short Term Goals    STG 1  Patient will be independent and compliant with initial home exercise program.  -     STG 1 Progress  Met  -     STG 2  Patient will be independent with education for symptom management, joint protection and strategies to minimize stress on affected tissues  -     STG 2 Progress  Ongoing  -     STG 2 Progress Comments  continued education on activities to avoid to prevent irritation  -        Long Term Goals    LTG 1  Patient will be independent and compliant with advanced home exercise program to facilitate self-management of symptoms  -     LTG 1 Progress  Ongoing  -     LTG 1 Progress Comments  progressed with prone scapular exercises  -     LTG 2  Pt. will report L shoulder pain </= 1-2/10 with all daily activities and sleeping.  -     LTG 2  Progress  Ongoing  -     LTG 2 Progress Comments  3/10 with meds, 7/10 without meds  -     LTG 3  Patient will be able to roll on to and sleep on L side without waking from increased pain or symptoms.   -     LTG 3 Progress  Ongoing  -     LTG 4  Patient will score </= 15/100 on QuickDASH score, indicating decreased perceived functional disability  -     LTG 4 Progress  Ongoing  -     LTG 5  Pt. will increase L shoulder AROM flexion and abduction to at least 0-160 deg with minimal substitution to increase ease of bathing, grooming, and dressing.  -     LTG 5 Progress  Ongoing  -     LTG 6  Patient will score </= 10% on Neck Disability Index, indicating improved perceived functional ability with daily activities.  -     LTG 6 Progress  Ongoing  -       User Key  (r) = Recorded By, (t) = Taken By, (c) = Cosigned By    Initials Name Provider Type    Roque Priest PT Physical Therapist          Therapy Education  Given: HEP, Symptoms/condition management  Program: Progressed  How Provided: Verbal, Demonstration, Written  Provided to: Patient  Level of Understanding: Teach back education performed, Verbalized, Demonstrated              Time Calculation:   Start Time: 1530  Stop Time: 1627  Time Calculation (min): 57 min  Total Timed Code Minutes- PT: 45 minute(s)  Therapy Charges for Today     Code Description Service Date Service Provider Modifiers Qty    73916224030 HC PT THER PROC EA 15 MIN 3/2/2020 Roque Garcia, PT GP 2    31594078155 HC PT MANUAL THERAPY EA 15 MIN 3/2/2020 Roque Garcia, PT GP 1    [id number]  PT HOT OR COLD PACK TREAT MCARE 3/2/2020 Roque Garcia, PT GP 1                    Roque Garcia PT  3/2/2020

## 2020-03-05 ENCOUNTER — HOSPITAL ENCOUNTER (OUTPATIENT)
Dept: PHYSICAL THERAPY | Facility: HOSPITAL | Age: 35
Setting detail: THERAPIES SERIES
Discharge: HOME | End: 2020-03-05

## 2020-03-05 DIAGNOSIS — Z47.89 ORTHOPEDIC AFTERCARE: ICD-10-CM

## 2020-03-05 DIAGNOSIS — I82.622 ACUTE DEEP VEIN THROMBOSIS (DVT) OF OTHER VEIN OF LEFT UPPER EXTREMITY (HCC): ICD-10-CM

## 2020-03-05 DIAGNOSIS — G89.29 CHRONIC PAIN OF LEFT UPPER EXTREMITY: ICD-10-CM

## 2020-03-05 DIAGNOSIS — I87.1 VENOUS THORACIC OUTLET SYNDROME OF LEFT SUBCLAVIAN VEIN: ICD-10-CM

## 2020-03-05 DIAGNOSIS — M79.602 CHRONIC PAIN OF LEFT UPPER EXTREMITY: ICD-10-CM

## 2020-03-05 DIAGNOSIS — G54.0 THORACIC OUTLET SYNDROME: Primary | ICD-10-CM

## 2020-03-05 PROCEDURE — 97140 MANUAL THERAPY 1/> REGIONS: CPT | Performed by: PHYSICAL THERAPIST

## 2020-03-05 PROCEDURE — 97110 THERAPEUTIC EXERCISES: CPT | Performed by: PHYSICAL THERAPIST

## 2020-03-05 NOTE — THERAPY TREATMENT NOTE
"    Outpatient Physical Therapy Ortho Treatment Note  [facility]     Patient Name: Rad Baltazar  : 1985  MRN: [id number]  Today's Date: 3/5/2020      Visit Date: 2020    Visit Dx:    ICD-10-CM ICD-9-CM   1. Thoracic outlet syndrome G54.0 353.0   2. Chronic pain of left upper extremity M79.602 729.5    G89.29 338.29   3. Acute deep vein thrombosis (DVT) of other vein of left upper extremity (CMS/[facility]) I82.622 453.82   4. Orthopedic aftercare Z47.89 V54.9   5. Venous thoracic outlet syndrome of left subclavian vein I87.1 459.2       Patient Active Problem List   Diagnosis   • Venous thoracic outlet syndrome of left subclavian vein        Past Medical History:   Diagnosis Date   • Arm vein blood clot, left         Past Surgical History:   Procedure Laterality Date   • FIRST RIB RESECTION Left 2020    Procedure: LEFT FIRST RIB RESECTION;  Surgeon: Dejuan White MD;  Location: [facility] OR;  Service: Vascular;  Laterality: Left;   • VENOGRAM PERIPHERAL Left 2019    Procedure: LEFT ARM VENOGRAM AND THROMOLYSIS;  Surgeon: Dejuan White MD;  [facility] ;  Service: Vascular   • WISDOM TOOTH EXTRACTION                         PT Assessment/Plan     Row Name 20 1406          PT Assessment    Assessment Comments  AROM of LUE and cervical spine has improved for all planes of motion. Continued reports of \"tightness and pins/needles\" into chest/pectoral tissues. Incision glue still present on the skin. Area of biggest complaint is between two incisions.  Manual therapy working in/around cervical tissues to address soft tissue restrictions but unable to address complaint area as I do not want to introduce any potential for infection with use of massage cream . Noted soft tissue tension in L upper trap and L SCM . PAtient to have MD follow up next Tuesday. 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 Suggested use of NSAIDs and ice upon " "completion of day as he will most likely be irritated from working a 10 hour day.   -CJ       User Key  (r) = Recorded By, (t) = Taken By, (c) = Cosigned By    Initials Name Provider Type    CJ Roque Garcia, PT Physical Therapist            OP Exercises     Row Name 03/05/20 1300             Subjective Comments    Subjective Comments  Arm still gets cold but frequency and intensity is less. Mostly notice it at the end of the day when my arm is dependent position. My neck is still really tight on the L side. I still feels tingling/pins and needles in L chest.  -CJ         Subjective Pain    Able to rate subjective pain?  yes  -CJ      Pre-Treatment Pain Level  3  -CJ      Subjective Pain Comment  5.5/10 in am without pain medicine  -CJ         Total Minutes    77679 - PT Therapeutic Exercise Minutes  35  -CJ         Exercise 1    Exercise Name 1  Thoracic stretch, blue noodle  -CJ      Time 1  deep breathing 10x with thoracic cavity expansion  -CJ         Exercise 2    Exercise Name 2  Serratus punches over thoracic roll  -CJ      Reps 2  20, #3  -CJ         Exercise 3    Exercise Name 3  Pectoral stretch, supine over blue noodle  -CJ      Reps 3  alternating arms in tolerated range x 15  -CJ      Time 3  3 min  -CJ         Exercise 4    Exercise Name 4  B shldr ER, GTB   -CJ      Reps 4  15  -CJ      Additional Comments  noodle behind back  -CJ         Exercise 5    Exercise Name 5  Sidelying thoracic rotation  -CJ      Reps 5  3 B  -CJ      Time 5  30 sec  -CJ         Exercise 6    Exercise Name 6  Lat Pulls, GTB  -CJ      Cueing 6  Tactile  -CJ      Reps 6  25  -CJ         Exercise 7    Exercise Name 7  ROWs, GTB  -CJ      Reps 7  15  -CJ      Time 7  5 sec  -CJ         Exercise 8    Exercise Name 8  supine tricep \"skull crushers\"  -CJ      Reps 8  20, #3  -CJ         Exercise 9    Exercise Name 9  Post. shoulder rolls  -CJ      Sets 9  3  -CJ      Reps 9  10  -CJ         Exercise 10    Exercise Name 10  Doorway " stretch  -CJ      Reps 10  3  -CJ      Time 10  20 sec  -CJ      Additional Comments  T position, next session try feild goal  -CJ         Exercise 11    Exercise Name 11  shoulder ext, GTB  -CJ      Cueing 11  Verbal;Tactile  -CJ      Reps 11  20  -CJ      Time 11  2 sec hold  -CJ         Exercise 12    Exercise Name 12  Prone adduc/depression  -CJ      Reps 12  15  -CJ         Exercise 13    Exercise Name 13  supine butterflys on towel roll  -CJ      Reps 13  15  -CJ        User Key  (r) = Recorded By, (t) = Taken By, (c) = Cosigned By    Initials Name Provider Type     Roque Garcia, PT Physical Therapist                                          Time Calculation:   Start Time: 1300  Stop Time: 1350  Time Calculation (min): 50 min  Total Timed Code Minutes- PT: 50 minute(s)  Therapy Charges for Today     Code Description Service Date Service Provider Modifiers Qty    [id number]  PT THER PROC EA 15 MIN 3/5/2020 Roque Garcia, PT GP 2    [id number]  PT MANUAL THERAPY EA 15 MIN 3/5/2020 Roque Garcia, PT GP 1                    Roque Garcia, PT  3/5/2020

## 2020-03-11 ENCOUNTER — HOSPITAL ENCOUNTER (OUTPATIENT)
Dept: PHYSICAL THERAPY | Facility: HOSPITAL | Age: 35
Setting detail: THERAPIES SERIES
Discharge: HOME | End: 2020-03-11

## 2020-03-11 DIAGNOSIS — M79.602 CHRONIC PAIN OF LEFT UPPER EXTREMITY: ICD-10-CM

## 2020-03-11 DIAGNOSIS — Z47.89 ORTHOPEDIC AFTERCARE: ICD-10-CM

## 2020-03-11 DIAGNOSIS — I82.622 ACUTE DEEP VEIN THROMBOSIS (DVT) OF OTHER VEIN OF LEFT UPPER EXTREMITY (HCC): ICD-10-CM

## 2020-03-11 DIAGNOSIS — I87.1 VENOUS THORACIC OUTLET SYNDROME OF LEFT SUBCLAVIAN VEIN: ICD-10-CM

## 2020-03-11 DIAGNOSIS — G54.0 THORACIC OUTLET SYNDROME: Primary | ICD-10-CM

## 2020-03-11 DIAGNOSIS — G89.29 CHRONIC PAIN OF LEFT UPPER EXTREMITY: ICD-10-CM

## 2020-03-11 PROCEDURE — 97140 MANUAL THERAPY 1/> REGIONS: CPT

## 2020-03-11 PROCEDURE — 97110 THERAPEUTIC EXERCISES: CPT

## 2020-03-11 NOTE — THERAPY TREATMENT NOTE
Outpatient Physical Therapy Ortho Treatment Note  [facility]     Patient Name: Rad Baltazar  : 1985  MRN: [id number]  Today's Date: 3/11/2020      Visit Date: 2020    Visit Dx:    ICD-10-CM ICD-9-CM   1. Thoracic outlet syndrome G54.0 353.0   2. Chronic pain of left upper extremity M79.602 729.5    G89.29 338.29   3. Acute deep vein thrombosis (DVT) of other vein of left upper extremity (CMS/[facility]) I82.622 453.82   4. Orthopedic aftercare Z47.89 V54.9   5. Venous thoracic outlet syndrome of left subclavian vein I87.1 459.2       Patient Active Problem List   Diagnosis   • Venous thoracic outlet syndrome of left subclavian vein        Past Medical History:   Diagnosis Date   • Arm vein blood clot, left         Past Surgical History:   Procedure Laterality Date   • FIRST RIB RESECTION Left 2020    Procedure: LEFT FIRST RIB RESECTION;  Surgeon: Dejuan White MD;  Location: [facility] OR;  Service: Vascular;  Laterality: Left;   • VENOGRAM PERIPHERAL Left 2019    Procedure: LEFT ARM VENOGRAM AND THROMOLYSIS;  Surgeon: Dejuan White MD;  Location: [facility] OR ;  Service: Vascular   • WISDOM TOOTH EXTRACTION                         PT Assessment/Plan     Row Name 20 1715          PT Assessment    Assessment Comments  Pt. presents today with increased irritation in/around incision as today was his first day back to work. Pt. had follow up with Dr. White on Tuesday and reports he is healing well and that he has a surgery scheduled at the end of the month for a stent placement. Continued with STM and in/around incision with several trigger points in pectoral tissue and along SCM insertion. Several loclaized twitch responses with palpation to taut bands in SCM. Pt tolerated progression of exercises well with increased reps/resistance. Following STM at end of session pt. reports feeling the best he has and that he feels he was able to move his arm  more.   -CJ (r)  (t) CJ (c)        PT Plan    PT Plan Comments  Progress stranding strengthening as patient has returned to work; Continue with STM as needed   -CJ (r)  (t) CJ (c)       User Key  (r) = Recorded By, (t) = Taken By, (c) = Cosigned By    Initials Name Provider Type    Roque Priest, PT Physical Therapist     Kathy Barrera, PT Student PT Student            OP Exercises     Row Name 03/11/20 1700 03/11/20 1600          Subjective Comments    Subjective Comments  --  I am okay. 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 I saw the doctor yesterakrishan and he said things are healing well and I have a surgery scheduled at the end of this month because the clot is still there.  -CJ (r)  (t) CJ (c)        Subjective Pain    Able to rate subjective pain?  --  yes  -CJ (r)  (t) CJ (c)     Pre-Treatment Pain Level  --  3  -CJ (r)  (t) CJ (c)        Total Minutes    15489 - PT Therapeutic Exercise Minutes  --  33  -CJ (r)  (t) CJ (c)     67515 - PT Manual Therapy Minutes  17  -CJ (r)  (t) CJ (c)  --        Exercise 1    Exercise Name 1  --  Thoracic stretch, blue noodle  -CJ (r)  (t) CJ (c)     Cueing 1  --  Verbal  -CJ (r)  (t) CJ (c)     Time 1  --  deep breathing 10x with thoracic cavity expansion  -CJ (r)  (t) CJ (c)        Exercise 2    Exercise Name 2  --  Serratus punches over thoracic roll  -CJ (r)  (t) CJ (c)     Reps 2  --  20 4#  -CJ (r)  (t) CJ (c)        Exercise 3    Exercise Name 3  --  Pectoral stretch, supine over blue noodle  -CJ (r)  (t) CJ (c)     Reps 3  --  alternating arms in tolerated range x 15  -CJ (r)  (t) CJ (c)     Time 3  --  3 min  -CJ (r)  (t) CJ (c)        Exercise 4    Exercise Name 4  --  B shldr ER, GTB   -CJ (r)  (t) CJ (c)     Cueing 4  --  Verbal  -CJ (debora) CHRISTOPHER (alberto) FRANCO (c)     Reps 4  --  15  -FRANCO (debora) CHRISTOPHER (alberto) FRANCO (c)     Additional Comments  --  on noodle  -FRANCO (debora) CHRISTOPHER (alberto) FRANCO (c)        Exercise 5    Exercise  "Name 5  --  Sidelying thoracic rotation  -CJ (r) MH (t) CJ (c)     Reps 5  --  3 B  -CJ (r) MH (t) CJ (c)     Time 5  --  30 sec  -CJ (r) MH (t) CJ (c)        Exercise 6    Exercise Name 6  --  Lat Pulls, GTB  -CJ (r) MH (t) CJ (c)     Cueing 6  --  Verbal;Tactile  -CJ (r) MH (t) CJ (c)     Reps 6  --  25  -CJ (r) MH (t) CJ (c)        Exercise 7    Exercise Name 7  --  ROWs, GTB  -CJ (r) MH (t) CJ (c)     Cueing 7  --  Verbal  -CJ (r) MH (t) CJ (c)     Reps 7  --  15  -CJ (r) MH (t) CJ (c)        Exercise 8    Exercise Name 8  --  supine tricep \"skull crushers\"  -CJ (r) MH (t) CJ (c)     Cueing 8  --  Verbal  -CJ (r) MH (t) CJ (c)     Reps 8  --  20, #4  -CJ (r) MH (t) CJ (c)        Exercise 9    Exercise Name 9  --  Post. shoulder rolls  -CJ (r) MH (t) CJ (c)     Cueing 9  --  Verbal  -CJ (r) MH (t) CJ (c)     Reps 9  --  15  -CJ (r) MH (t) CJ (c)     Additional Comments  --  after manual  -CJ (r) MH (t) CJ (c)        Exercise 11    Exercise Name 11  --  shoulder ext, GTB  -CJ (r) MH (t) CJ (c)     Cueing 11  --  Verbal;Tactile  -CJ (r) MH (t) CJ (c)     Reps 11  --  20  -CJ (r) MH (t) CJ (c)     Time 11  --  2 sec hold  -CJ (r) MH (t) CJ (c)        Exercise 12    Exercise Name 12  --  Prone adduc/depression  -CJ (r) MH (t) CJ (c)     Reps 12  --  15  -CJ (r) MH (t) CJ (c)        Exercise 13    Exercise Name 13  --  supine butterflys on towel roll  -CJ (r) MH (t) CJ (c)     Cueing 13  --  Verbal  -CJ (r)  (t) CJ (c)     Reps 13  --  15  -CJ (r)  (t) CJ (c)        Exercise 14    Exercise Name 14  --  horizontal abduction - supine  -CJ (r)  (t) CJ (c)     Cueing 14  --  Verbal;Demo  -CJ (r)  (t) CJ (c)     Reps 14  --  15 RTB   -CJ (r)  (t) CJ (c)       User Key  (r) = Recorded By, (t) = Taken By, (c) = Cosigned By    Initials Name Provider Type    Roque Priest, PT Physical Therapist    Kathy Montague, PT Student PT Student                      Manual Rx (last 36 hours)      Manual Treatments     " Row Name 03/11/20 1700             Total Minutes    76051 - PT Manual Therapy Minutes  17  -CJ (r) MH (t) CJ (c)         Manual Rx 1    Manual Rx 1 Location  R sidelying, hugging a pillow  -CJ (r) MH (t) CJ (c)      Manual Rx 1 Type  STM to L scapular complex and L cervical tissues, pecoral musculature, incision  -CJ (r) MH (t) CJ (c)      Manual Rx 1 Grade  Significant tension noted in L upper trap and SCM, pectoral  -CJ (r) MH (t) CJ (c)        User Key  (r) = Recorded By, (t) = Taken By, (c) = Cosigned By    Initials Name Provider Type    CJ Roque Garcia, PT Physical Therapist    Kathy Montague, ELISHA Student PT Student          PT OP Goals     Row Name 03/11/20 1700          PT Short Term Goals    STG 1  Patient will be independent and compliant with initial home exercise program.  -CJ (r) MH (t) CJ (c)     STG 1 Progress  Met  -CJ (r) MH (t) CJ (c)     STG 2  Patient will be independent with education for symptom management, joint protection and strategies to minimize stress on affected tissues  -CJ (r) MH (t) CJ (c)     STG 2 Progress  Ongoing  -CJ (r) MH (t) CJ (c)        Long Term Goals    LTG 1  Patient will be independent and compliant with advanced home exercise program to facilitate self-management of symptoms  -CJ (r) MH (t) CJ (c)     LTG 1 Progress  Ongoing  -CJ (r) MH (t) CJ (c)     LTG 2  Pt. will report L shoulder pain </= 1-2/10 with all daily activities and sleeping.  -CJ (r) MH (t) CJ (c)     LTG 2 Progress  Ongoing  -CJ (r) MH (t) CJ (c)     LTG 3  Patient will be able to roll on to and sleep on L side without waking from increased pain or symptoms.   -CJ (r) MH (t) CJ (c)     LTG 3 Progress  Ongoing  -CJ (r) MH (t) CJ (c)     LTG 4  Patient will score </= 15/100 on QuickDASH score, indicating decreased perceived functional disability  -CJ (r) MH (t) CJ (c)     LTG 4 Progress  Ongoing  -CJ (r) MH (t) CJ (c)     LTG 5  Pt. will increase L shoulder AROM flexion and abduction to at least 0-160  deg with minimal substitution to increase ease of bathing, grooming, and dressing.  -CJ (r) MH (t) CJ (c)     LTG 5 Progress  Ongoing  -CJ (r) MH (t) CJ (c)     LTG 6  Patient will score </= 10% on Neck Disability Index, indicating improved perceived functional ability with daily activities.  -CJ (r) MH (t) CJ (c)     LTG 6 Progress  Ongoing  -CJ (r) MH (t) CJ (c)       User Key  (r) = Recorded By, (t) = Taken By, (c) = Cosigned By    Initials Name Provider Type    Roque Priest, PT Physical Therapist     Kathy Barrera, PT Student PT Student          Therapy Education  Program: Reinforced  How Provided: Verbal, Demonstration  Provided to: Patient  Level of Understanding: Teach back education performed, Verbalized, Demonstrated              Time Calculation:   Start Time: 1610  Stop Time: 1700  Time Calculation (min): 50 min  Therapy Charges for Today     Code Description Service Date Service Provider Modifiers Qty    79848301989  PT THER PROC EA 15 MIN 3/11/2020 Kathy Barrera PT Student GP 2    [id number]  PT MANUAL THERAPY EA 15 MIN 3/11/2020 Kathy Barrera PT Student GP 1                    ELISHA Kinney  3/11/2020

## 2020-03-13 ENCOUNTER — HOSPITAL ENCOUNTER (OUTPATIENT)
Dept: PHYSICAL THERAPY | Facility: HOSPITAL | Age: 35
Setting detail: THERAPIES SERIES
Discharge: HOME | End: 2020-03-13

## 2020-03-13 DIAGNOSIS — I82.622 ACUTE DEEP VEIN THROMBOSIS (DVT) OF OTHER VEIN OF LEFT UPPER EXTREMITY (HCC): ICD-10-CM

## 2020-03-13 DIAGNOSIS — Z47.89 ORTHOPEDIC AFTERCARE: ICD-10-CM

## 2020-03-13 DIAGNOSIS — M79.602 CHRONIC PAIN OF LEFT UPPER EXTREMITY: ICD-10-CM

## 2020-03-13 DIAGNOSIS — I87.1 VENOUS THORACIC OUTLET SYNDROME OF LEFT SUBCLAVIAN VEIN: ICD-10-CM

## 2020-03-13 DIAGNOSIS — G89.29 CHRONIC PAIN OF LEFT UPPER EXTREMITY: ICD-10-CM

## 2020-03-13 DIAGNOSIS — G54.0 THORACIC OUTLET SYNDROME: Primary | ICD-10-CM

## 2020-03-13 PROCEDURE — 97140 MANUAL THERAPY 1/> REGIONS: CPT | Performed by: PHYSICAL THERAPIST

## 2020-03-13 PROCEDURE — 97110 THERAPEUTIC EXERCISES: CPT | Performed by: PHYSICAL THERAPIST

## 2020-03-13 NOTE — THERAPY TREATMENT NOTE
"    Outpatient Physical Therapy Ortho Treatment Note  [facility]     Patient Name: Rad Baltazar  : 1985  MRN: [id number]  Today's Date: 3/13/2020      Visit Date: 2020    Visit Dx:    ICD-10-CM ICD-9-CM   1. Thoracic outlet syndrome G54.0 353.0   2. Chronic pain of left upper extremity M79.602 729.5    G89.29 338.29   3. Acute deep vein thrombosis (DVT) of other vein of left upper extremity (CMS/[facility]) I82.622 453.82   4. Orthopedic aftercare Z47.89 V54.9   5. Venous thoracic outlet syndrome of left subclavian vein I87.1 459.2       Patient Active Problem List   Diagnosis   • Venous thoracic outlet syndrome of left subclavian vein        Past Medical History:   Diagnosis Date   • Arm vein blood clot, left         Past Surgical History:   Procedure Laterality Date   • FIRST RIB RESECTION Left 2020    Procedure: LEFT FIRST RIB RESECTION;  Surgeon: Dejuan White MD;  Location: [facility] OR;  Service: Vascular;  Laterality: Left;   • VENOGRAM PERIPHERAL Left 2019    Procedure: LEFT ARM VENOGRAM AND THROMOLYSIS;  Surgeon: Dejuan White MD;  Location: [facility] OR ;  Service: Vascular   • WISDOM TOOTH EXTRACTION                         PT Assessment/Plan     Row Name 20 1729          PT Assessment    Assessment Comments  Mr. Baltazar reports returning to the gym since last session to work on \"back stuff.\" Reviewed dos/donts with patient ie. no chest press, overhead press, chest flys, squats with bar on shoulders/neck, etc. He reports surgery scheduled for 3/30/20 to address blood clot. Manual therapy techniques with dry cupping performed to address soft tissue/scar adhesions. Declined ice in clinic stating he would perform at home.   -FRANCO       User Key  (r) = Recorded By, (t) = Taken By, (c) = Cosigned By    Initials Name Provider Type    Roque Priest, PT Physical Therapist            OP Exercises     Row Name 20 1700 20 1600       " "   Subjective Comments    Subjective Comments  --  It felt significantly better after the massage work into the chest last time. Still burning sensation in the chest.  -CJ        Subjective Pain    Able to rate subjective pain?  --  yes  -CJ     Pre-Treatment Pain Level  --  3  -CJ        Total Minutes    30506 - PT Therapeutic Exercise Minutes  --  35  -CJ     32572 - PT Manual Therapy Minutes  15  -CJ  --        Exercise 1    Exercise Name 1  --  Thoracic stretch, blue noodle  -CJ     Cueing 1  --  Verbal  -CJ     Time 1  --  deep breathing 10x with thoracic cavity expansion  -CJ        Exercise 2    Exercise Name 2  --  Serratus punches over thoracic roll  -CJ     Sets 2  --  3  -CJ     Reps 2  --  10 #4  -CJ        Exercise 3    Exercise Name 3  --  Pectoral stretch, supine over blue noodle  -CJ     Reps 3  --  alternating arms in tolerated range x 15  -CJ     Time 3  --  3 min  -CJ        Exercise 4    Exercise Name 4  --  B shldr ER, GTB   -CJ     Cueing 4  --  Verbal  -CJ     Reps 4  --  15  -CJ     Time 4  --  on noodle   -CJ        Exercise 5    Exercise Name 5  --  Sidelying thoracic rotation  -CJ     Reps 5  --  3 B  -CJ     Time 5  --  30 sec  -CJ        Exercise 6    Exercise Name 6  --  Lat Pulls, BTB  -CJ     Cueing 6  --  Verbal;Tactile  -CJ     Reps 6  --  25  -CJ        Exercise 7    Exercise Name 7  --  ROWs, BTB  -CJ     Cueing 7  --  Verbal  -CJ     Reps 7  --  15  -CJ        Exercise 8    Exercise Name 8  --  supine tricep \"skull crushers\"  -CJ     Cueing 8  --  Verbal  -CJ     Reps 8  --  20, #4  -CJ        Exercise 9    Exercise Name 9  --  Post. shoulder rolls  -CJ     Cueing 9  --  Verbal  -CJ     Sets 9  --  3  -CJ     Reps 9  --  10  -CJ     Additional Comments  --  after doorway stretch  -CJ        Exercise 10    Exercise Name 10  --  Doorway stretch  -CJ     Reps 10  --  3  -CJ     Time 10  --  20 sec  -CJ     Additional Comments  --  field goal position  -CJ        Exercise 11    " Exercise Name 11  --  shoulder ext,BTB  -     Cueing 11  --  Verbal;Tactile  -CJ     Reps 11  --  20  -CJ     Time 11  --  2 sec hold  -CJ        Exercise 12    Exercise Name 12  --  D2 ext/flex 3 plates on Tuff Stuff  -CJ     Cueing 12  --  Verbal;Tactile  -CJ     Reps 12  --  15  -CJ     Additional Comments  --  set scapula first  -        Exercise 13    Exercise Name 13  --  supine butterflys on towel roll  -CJ     Cueing 13  --  Verbal  -CJ     Reps 13  --  15  -CJ        Exercise 14    Exercise Name 14  --  supine star on noodle  -CJ     Reps 14  --  10 GTB  -CJ        Exercise 15    Exercise Name 15  --  Lateral doorway stretch  -CJ     Reps 15  --  3  -CJ     Time 15  --  15 sec  -CJ       User Key  (r) = Recorded By, (t) = Taken By, (c) = Cosigned By    Initials Name Provider Type    Roque Priest, PT Physical Therapist                      Manual Rx (last 36 hours)      Manual Treatments     Row Name 03/13/20 1700             Total Minutes    63757 - PT Manual Therapy Minutes  15  -CJ         Manual Rx 1    Manual Rx 1 Location  R sidelying, hugging a pillow  -      Manual Rx 1 Type  STM to L scapular complex and L cervical tissues, pecoral musculature, incision  -      Manual Rx 1 Grade  dry cupping to L pectoral tissue and scar for adhesion work  -        User Key  (r) = Recorded By, (t) = Taken By, (c) = Cosigned By    Initials Name Provider Type    Roque Priest PT Physical Therapist                             Time Calculation:   Start Time: 1615  Stop Time: 1700  Time Calculation (min): 45 min  Total Timed Code Minutes- PT: 45 minute(s)  Therapy Charges for Today     Code Description Service Date Service Provider Modifiers Qty    63903260016  PT THER PROC EA 15 MIN 3/13/2020 Roque Garcia, PT GP 2    [id number] HC PT MANUAL THERAPY EA 15 MIN 3/13/2020 Roque Garcia, PT GP 1                    Roque Garcia PT  3/13/2020

## 2020-03-17 ENCOUNTER — HOSPITAL ENCOUNTER (OUTPATIENT)
Dept: PHYSICAL THERAPY | Facility: HOSPITAL | Age: 35
Setting detail: THERAPIES SERIES
Discharge: HOME | End: 2020-03-17

## 2020-03-17 DIAGNOSIS — Z47.89 ORTHOPEDIC AFTERCARE: ICD-10-CM

## 2020-03-17 DIAGNOSIS — G89.29 CHRONIC PAIN OF LEFT UPPER EXTREMITY: ICD-10-CM

## 2020-03-17 DIAGNOSIS — I82.622 ACUTE DEEP VEIN THROMBOSIS (DVT) OF OTHER VEIN OF LEFT UPPER EXTREMITY (HCC): ICD-10-CM

## 2020-03-17 DIAGNOSIS — I87.1 VENOUS THORACIC OUTLET SYNDROME OF LEFT SUBCLAVIAN VEIN: ICD-10-CM

## 2020-03-17 DIAGNOSIS — M79.602 CHRONIC PAIN OF LEFT UPPER EXTREMITY: ICD-10-CM

## 2020-03-17 DIAGNOSIS — G54.0 THORACIC OUTLET SYNDROME: Primary | ICD-10-CM

## 2020-03-17 PROCEDURE — 97110 THERAPEUTIC EXERCISES: CPT

## 2020-03-17 PROCEDURE — 97140 MANUAL THERAPY 1/> REGIONS: CPT

## 2020-03-17 NOTE — THERAPY TREATMENT NOTE
Outpatient Physical Therapy Ortho Treatment Note  [facility]     Patient Name: Rad Baltazar  : 1985  MRN: [id number]  Today's Date: 3/17/2020      Visit Date: 2020    Visit Dx:    ICD-10-CM ICD-9-CM   1. Thoracic outlet syndrome G54.0 353.0   2. Chronic pain of left upper extremity M79.602 729.5    G89.29 338.29   3. Acute deep vein thrombosis (DVT) of other vein of left upper extremity (CMS/[facility]) I82.622 453.82   4. Orthopedic aftercare Z47.89 V54.9   5. Venous thoracic outlet syndrome of left subclavian vein I87.1 459.2       Patient Active Problem List   Diagnosis   • Venous thoracic outlet syndrome of left subclavian vein        Past Medical History:   Diagnosis Date   • Arm vein blood clot, left         Past Surgical History:   Procedure Laterality Date   • FIRST RIB RESECTION Left 2020    Procedure: LEFT FIRST RIB RESECTION;  Surgeon: Dejuan White MD;  Location: [facility] OR;  Service: Vascular;  Laterality: Left;   • VENOGRAM PERIPHERAL Left 2019    Procedure: LEFT ARM VENOGRAM AND THROMOLYSIS;  Surgeon: Dejuan White MD;  Location: [facility] OR ;  Service: Vascular   • WISDOM TOOTH EXTRACTION                         PT Assessment/Plan     Row Name 20 0954          PT Assessment    Assessment Comments  Pt. states he has been to the gym 5 days in a row now, he reports he has been avoiding chest exercsies as discussed previously. Does state that he gets significant increase in swelling in thoracic area whene working out and immediately after but improves with ice. Tolerated progression of reps/addition of exercises with no increase in symptoms. Continued with STM with significant decrease in tension surrounding incisions, SCM, and upper trap. Decreased size of trigger point in SCM/upper trap. Pt. with improved mobility following session, stating he feels he can raise his arms higer.  -CN (r) MH (t) CN (c)        PT Plan    PT Plan  Comments  Progress rows, extension, lat pulls to tuff stuff.   -CN (r) MH (t) CN (c)       User Key  (r) = Recorded By, (t) = Taken By, (c) = Cosigned By    Initials Name Provider Type    Kia Esteban, PT Physical Therapist    Kathy Montague, PT Student PT Student            OP Exercises     Row Name 03/17/20 0900             Subjective Comments    Subjective Comments  I feel good. I have been to the gym the last 5 days and it does get pretty swollen when I am at the gym but I ice after.  -CN (r) MH (t) CN (c)         Subjective Pain    Able to rate subjective pain?  yes  -CN (r) MH (t) CN (c)      Pre-Treatment Pain Level  3  -CN (r) MH (t) CN (c)         Total Minutes    77212 - PT Therapeutic Exercise Minutes  33  -CN (r) MH (t) CN (c)      13939 - PT Manual Therapy Minutes  13  -CN (r) MH (t) CN (c)         Exercise 1    Exercise Name 1  Thoracic stretch, blue noodle  -CN (r) MH (t) CN (c)      Cueing 1  Verbal  -CN (r) MH (t) CN (c)      Time 1  deep breathing 10x with thoracic cavity expansion  -CN (r) MH (t) CN (c)         Exercise 2    Exercise Name 2  Serratus punches over thoracic roll  -CN (r) MH (t) CN (c)      Cueing 2  Verbal  -CN (r) MH (t) CN (c)      Sets 2  3  -CN (r) MH (t) CN (c)      Reps 2  10 #5  -CN (r) MH (t) CN (c)         Exercise 3    Exercise Name 3  Pectoral stretch, supine over blue noodle  -CN (r) MH (t) CN (c)      Cueing 3  Verbal  -CN (r) MH (t) CN (c)      Reps 3  alternating arms in tolerated range x 15  -CN (r) MH (t) CN (c)      Time 3  3 min  -CN (r) MH (t) CN (c)         Exercise 4    Exercise Name 4  B shldr ER, BTB   -CN (r) MH (t) CN (c)      Cueing 4  Verbal  -CN (r) MH (t) CN (c)      Reps 4  15  -CN (r) MH (t) CN (c)      Time 4  on noodle   -CN (r) MH (t) CN (c)         Exercise 5    Exercise Name 5  Sidelying thoracic rotation  -CN (r) MH (t) CN (c)      Cueing 5  Verbal  -CN (r) MH (t) CN (c)      Reps 5  3 B  -CN (r) MH (t) CN (c)      Time 5  30  "sec  -CN (r) MH (t) CN (c)         Exercise 6    Exercise Name 6  Lat Pulls, BTB  -CN (r) MH (t) CN (c)      Cueing 6  Verbal;Tactile  -CN (r) MH (t) CN (c)      Reps 6  25  -CN (r) MH (t) CN (c)         Exercise 7    Exercise Name 7  ROWs, BTB  -CN (r) MH (t) CN (c)      Cueing 7  Verbal  -CN (r) MH (t) CN (c)      Sets 7  2  -CN (r) MH (t) CN (c)      Reps 7  15  -CN (r) MH (t) CN (c)         Exercise 8    Exercise Name 8  supine tricep \"skull crushers\"  -CN (r) MH (t) CN (c)      Cueing 8  Verbal  -CN (r) MH (t) CN (c)      Reps 8  20, #4  -CN (r) MH (t) CN (c)         Exercise 9    Exercise Name 9  Post. shoulder rolls  -CN (r) MH (t) CN (c)      Cueing 9  Verbal  -CN (r) MH (t) CN (c)      Sets 9  3  -CN (r) MH (t) CN (c)      Reps 9  10  -CN (r) MH (t) CN (c)         Exercise 10    Exercise Name 10  Doorway stretch  -CN (r) MH (t) CN (c)      Cueing 10  Verbal  -CN (r) MH (t) CN (c)      Reps 10  3  -CN (r) MH (t) CN (c)      Time 10  20  -CN (r) MH (t) CN (c)      Additional Comments  field goal position  -CN (r) MH (t) CN (c)         Exercise 11    Exercise Name 11  shoulder ext,BTB  -CN (r) MH (t) CN (c)      Cueing 11  Verbal;Tactile  -CN (r) MH (t) CN (c)      Reps 11  20  -CN (r) MH (t) CN (c)      Time 11  2 sec hold  -CN (r) MH (t) CN (c)         Exercise 12    Exercise Name 12  D1 ext/flex 3 plates on Tuff Stuff  -CN (r) MH (t) CN (c)      Cueing 12  Verbal;Tactile  -CN (r) MH (t) CN (c)      Reps 12  15  -CN (r) MH (t) CN (c)         Exercise 13    Exercise Name 13  supine butterflys on towel roll  -CN (r) MH (t) CN (c)      Cueing 13  Verbal  -CN (r) MH (t) CN (c)      Reps 13  15  -CN (r) MH (t) CN (c)         Exercise 14    Exercise Name 14  supine star on noodle  -CN (r) MH (t) CN (c)      Cueing 14  Verbal;Demo  -CN (r) MH (t) CN (c)      Reps 14  10 GTB  -CN (r) MH (t) CN (c)         Exercise 15    Exercise Name 15  Lateral doorway stretch  -CN (r) MH (t) CN (c)      Cueing 15  Verbal  -CN (r) " MH (t) CN (c)      Reps 15  3  -CN (r) MH (t) CN (c)      Time 15  15  -CN (r) MH (t) CN (c)         Exercise 16    Exercise Name 16  shoulder press wit noodle  -CN (r) MH (t) CN (c)      Cueing 16  Verbal;Tactile;Demo  -CN (r) MH (t) CN (c)      Reps 16  15  -CN (r) MH (t) CN (c)      Additional Comments  no weight  -CN (r) MH (t) CN (c)         Exercise 17    Exercise Name 17  standing ER with noodle  -CN (r) MH (t) CN (c)      Cueing 17  Verbal;Demo  -CN (r) MH (t) CN (c)      Reps 17  15  -CN (r) MH (t) CN (c)      Additional Comments  GTB  -CN (r) MH (t) CN (c)        User Key  (r) = Recorded By, (t) = Taken By, (c) = Cosigned By    Initials Name Provider Type    Kia Esteban, PT Physical Therapist     Kathy Barrera, PT Student PT Student                      Manual Rx (last 36 hours)      Manual Treatments     Row Name 03/17/20 0900             Total Minutes    33669 - PT Manual Therapy Minutes  13  -CN (r) MH (t) CN (c)         Manual Rx 1    Manual Rx 1 Location  R sidelying, hugging a pillow  -CN (r) MH (t) CN (c)      Manual Rx 1 Type  STM to L scapular complex and L cervical tissues, pecoral musculature, incision  -CN (r) MH (t) CN (c)        User Key  (r) = Recorded By, (t) = Taken By, (c) = Cosigned By    Initials Name Provider Type    Kia Esteban, PT Physical Therapist     Kathy Barrera, PT Student PT Student          PT OP Goals     Row Name 03/17/20 0900          PT Short Term Goals    STG 1  Patient will be independent and compliant with initial home exercise program.  -CN (r) MH (t) CN (c)     STG 1 Progress  Met  -CN (r) MH (t) CN (c)     STG 2  Patient will be independent with education for symptom management, joint protection and strategies to minimize stress on affected tissues  -CN (r) MH (t) CN (c)     STG 2 Progress  Met  -CN (r) MH (t) CN (c)        Long Term Goals    LTG 1  Patient will be independent and compliant with advanced home exercise program to  facilitate self-management of symptoms  -CN (r) MH (t) CN (c)     LTG 1 Progress  Ongoing  -CN (r) MH (t) CN (c)     LTG 2  Pt. will report L shoulder pain </= 1-2/10 with all daily activities and sleeping.  -CN (r) MH (t) CN (c)     LTG 2 Progress  Ongoing  -CN (r) MH (t) CN (c)     LTG 2 Progress Comments  2-3/10   -CN (r) MH (t) CN (c)     LTG 3  Patient will be able to roll on to and sleep on L side without waking from increased pain or symptoms.   -CN (r) MH (t) CN (c)     LTG 3 Progress  Ongoing  -CN (r) MH (t) CN (c)     LTG 4  Patient will score </= 15/100 on QuickDASH score, indicating decreased perceived functional disability  -CN (r) MH (t) CN (c)     LTG 4 Progress  Ongoing  -CN (r) MH (t) CN (c)     LTG 5  Pt. will increase L shoulder AROM flexion and abduction to at least 0-160 deg with minimal substitution to increase ease of bathing, grooming, and dressing.  -CN (r) MH (t) CN (c)     LTG 5 Progress  Ongoing  -CN (r) MH (t) CN (c)     LTG 6  Patient will score </= 10% on Neck Disability Index, indicating improved perceived functional ability with daily activities.  -CN (r) MH (t) CN (c)     LTG 6 Progress  Ongoing  -CN (r) MH (t) CN (c)       User Key  (r) = Recorded By, (t) = Taken By, (c) = Cosigned By    Initials Name Provider Type    Kia Esteban, PT Physical Therapist    Kathy Montague, PT Student PT Student          Therapy Education  Education Details: Re-educated on importance of avoiding certain exercises at gym and icing at home to help with swelling.  Program: Reinforced  How Provided: Verbal, Demonstration  Provided to: Patient  Level of Understanding: Teach back education performed, Verbalized, Demonstrated              Time Calculation:   Start Time: 0900  Stop Time: 0946  Time Calculation (min): 46 min  Total Timed Code Minutes- PT: 46 minute(s)  Therapy Charges for Today     Code Description Service Date Service Provider Modifiers Qty    99691613611  PT THER PROC EA  15 MIN 3/17/2020 Kathy Barrera, PT Student GP 2    [id number]  PT MANUAL THERAPY EA 15 MIN 3/17/2020 Kathy Barrera, PT Student GP 1                    Kathy Barrera, PT Student  3/17/2020

## 2020-03-20 ENCOUNTER — HOSPITAL ENCOUNTER (OUTPATIENT)
Dept: PHYSICAL THERAPY | Facility: HOSPITAL | Age: 35
Setting detail: THERAPIES SERIES
Discharge: HOME | End: 2020-03-20

## 2020-03-20 DIAGNOSIS — I87.1 VENOUS THORACIC OUTLET SYNDROME OF LEFT SUBCLAVIAN VEIN: ICD-10-CM

## 2020-03-20 DIAGNOSIS — G89.29 CHRONIC PAIN OF LEFT UPPER EXTREMITY: ICD-10-CM

## 2020-03-20 DIAGNOSIS — G54.0 THORACIC OUTLET SYNDROME: Primary | ICD-10-CM

## 2020-03-20 DIAGNOSIS — Z47.89 ORTHOPEDIC AFTERCARE: ICD-10-CM

## 2020-03-20 DIAGNOSIS — I82.622 ACUTE DEEP VEIN THROMBOSIS (DVT) OF OTHER VEIN OF LEFT UPPER EXTREMITY (HCC): ICD-10-CM

## 2020-03-20 DIAGNOSIS — M79.602 CHRONIC PAIN OF LEFT UPPER EXTREMITY: ICD-10-CM

## 2020-03-20 PROCEDURE — 97110 THERAPEUTIC EXERCISES: CPT

## 2020-03-20 NOTE — THERAPY TREATMENT NOTE
"    Outpatient Physical Therapy Ortho Treatment Note  [facility]     Patient Name: Rad Baltazar  : 1985  MRN: [id number]  Today's Date: 3/20/2020      Visit Date: 2020    Visit Dx:    ICD-10-CM ICD-9-CM   1. Thoracic outlet syndrome G54.0 353.0   2. Chronic pain of left upper extremity M79.602 729.5    G89.29 338.29   3. Acute deep vein thrombosis (DVT) of other vein of left upper extremity (CMS/[facility]) I82.622 453.82   4. Orthopedic aftercare Z47.89 V54.9   5. Venous thoracic outlet syndrome of left subclavian vein I87.1 459.2       Patient Active Problem List   Diagnosis   • Venous thoracic outlet syndrome of left subclavian vein        Past Medical History:   Diagnosis Date   • Arm vein blood clot, left         Past Surgical History:   Procedure Laterality Date   • FIRST RIB RESECTION Left 2020    Procedure: LEFT FIRST RIB RESECTION;  Surgeon: Dejuan White MD;  Location: [facility] OR;  Service: Vascular;  Laterality: Left;   • VENOGRAM PERIPHERAL Left 2019    Procedure: LEFT ARM VENOGRAM AND THROMOLYSIS;  Surgeon: Dejuan White MD;  Location: [facility] OR ;  Service: Vascular   • WISDOM TOOTH EXTRACTION                         PT Assessment/Plan     Row Name 20 1224          PT Assessment    Assessment Comments  Pt. reports continued improvement in \"chest tightness\" and pain. States he does occasionally get \"phantom pains\" that increase his pain to about 7/10 but decreases within a few minutes. Continued to re-educate patient on importance of icing after workouts/activities to allow for  tissue recovery and decrease swelling. Tolerated progression of exercises including rows and shoulder extension on tuff stuff and increased weight with D1 flexion/extension. Held on lat pulls downs on tuff stuff as patient reports lats are very sore today. Performed dry cupping to pectoral tissues with decreased tension around incision.  -FRANCO (r) CHRISTOPHER (t) FRANCO (c)  "       PT Plan    PT Plan Comments  Progress scapular strengthening exercises; update HEP  -CJ (r)  (t) CJ (c)       User Key  (r) = Recorded By, (t) = Taken By, (c) = Cosigned By    Initials Name Provider Type    Roque Priest, PT Physical Therapist     Kathy Barrera, PT Student PT Student          Modalities     Row Name 03/20/20 1000             Ice    Patient reports will apply ice at home to involved area  Yes  -CJ (r)  (t) CJ (c)        User Key  (r) = Recorded By, (t) = Taken By, (c) = Cosigned By    Initials Name Provider Type    Roque Priest, PT Physical Therapist    Kathy Montague, PT Student PT Student        OP Exercises     Row Name 03/20/20 1100 03/20/20 1000          Subjective Comments    Subjective Comments  --  I'm doing good. I still get some phatom pains but they are gone withiin 5 minutes.   -CJ (r)  (t) CJ (c)        Subjective Pain    Able to rate subjective pain?  --  yes  -CJ (r)  (t) CJ (c)     Pre-Treatment Pain Level  --  2  -CJ (r)  (t) CJ (c)        Total Minutes    47823 - PT Therapeutic Exercise Minutes  --  37  -CJ (r)  (t) CJ (c)     47209 - PT Manual Therapy Minutes  6  -CJ  --        Exercise 1    Exercise Name 1  --  Thoracic stretch, blue noodle  -CJ (r)  (t) CJ (c)     Cueing 1  --  Verbal  -CJ (r) MH (t) CJ (c)     Time 1  --  deep breathing 10x with thoracic cavity expansion  -CJ (r)  (t) CJ (c)        Exercise 2    Exercise Name 2  --  Serratus punches over thoracic roll  -CJ (r) MH (t) CJ (c)     Cueing 2  --  Verbal  -CJ (r) MH (t) CJ (c)     Sets 2  --  3  -CJ (r) MH (t) CJ (c)     Reps 2  --  10 #5  -CJ (r) MH (t) CJ (c)        Exercise 3    Exercise Name 3  --  Pectoral stretch, supine over blue noodle  -CJ (r) MH (t) CJ (c)     Cueing 3  --  Verbal  -CJ (r) MH (t) CJ (c)     Reps 3  --  alternating arms in tolerated range x 15  -FRANCO allen) CHRISTOPHER (alberto) FRANCO raymond)        Exercise 4    Exercise Name 4  --  B MARCIA Traore   -FRANCO serrano (r)) FRANCO raymond)      "Cueing 4  --  Verbal  -CJ (r) MH (t) CJ (c)     Reps 4  --  15  -CJ (r) MH (t) CJ (c)     Time 4  --  on noodle   -CJ (r) MH (t) CJ (c)        Exercise 5    Exercise Name 5  --  Sidelying thoracic rotation  -CJ (r) MH (t) CJ (c)     Cueing 5  --  Verbal  -CJ (r) MH (t) CJ (c)     Reps 5  --  3 B  -CJ (r) MH (t) CJ (c)     Time 5  --  30 sec  -CJ (r) MH (t) CJ (c)        Exercise 6    Exercise Name 6  --  Lat Pulls, BTB  -CJ (r) MH (t) CJ (c)     Cueing 6  --  Verbal;Tactile  -CJ (r) MH (t) CJ (c)     Sets 6  --  2  -CJ (r) MH (t) CJ (c)     Reps 6  --  15  -CJ (r) MH (t) CJ (c)        Exercise 7    Exercise Name 7  --  Rows on Tuff Stuff  -CJ (r) MH (t) CJ (c)     Cueing 7  --  Verbal  -CJ (r) MH (t) CJ (c)     Sets 7  --  2  -CJ (r) MH (t) CJ (c)     Reps 7  --  15  -CJ (r) MH (t) CJ (c)     Time 7  --  3 plates  -CJ (r) MH (t) CJ (c)        Exercise 8    Exercise Name 8  --  supine tricep \"skull crushers\"  -CJ (r) MH (t) CJ (c)     Cueing 8  --  Verbal  -CJ (r) MH (t) CJ (c)     Reps 8  --  20 #5  -CJ (r) MH (t) CJ (c)        Exercise 9    Exercise Name 9  --  Post. shoulder rolls  -CJ (r) MH (t) CJ (c)     Cueing 9  --  Verbal  -CJ (r) MH (t) CJ (c)     Sets 9  --  3  -CJ (r) MH (t) CJ (c)     Reps 9  --  10  -CJ (r) MH (t) CJ (c)        Exercise 10    Exercise Name 10  --  Doorway stretch  -CJ (r) MH (t) CJ (c)     Cueing 10  --  Verbal  -CJ (r) MH (t) CJ (c)     Reps 10  --  3  -CJ (r) MH (t) CJ (c)     Time 10  --  20  -CJ (r) MH (t) CJ (c)        Exercise 11    Exercise Name 11  --  shoulder extension on tuff stff  -CJ (r) MH (t) CJ (c)     Cueing 11  --  Verbal;Tactile  -CJ (r) MH (t) CJ (c)     Sets 11  --  2  -CJ (r) MH (t) CJ (c)     Reps 11  --  15  -CJ (r) MH (t) CJ (c)     Time 11  --  3 plates  -CJ (r) MH (t) CJ (c)        Exercise 12    Exercise Name 12  --  D1 ext/flex 4 plates on Tuff Stuff  -CJ (r) MH (t) CJ (c)     Cueing 12  --  Verbal;Tactile  -CJ (r) MH (t) CJ (c)     Reps 12  --  15  -CJ (r) "  (t) CJ (c)        Exercise 13    Exercise Name 13  --  supine butterflys on towel roll  -CJ (r)  (t) CJ (c)     Cueing 13  --  Verbal  -CJ (r)  (t) CJ (c)     Reps 13  --  20  -CJ (r)  (t) CJ (c)        Exercise 14    Exercise Name 14  --  standing star with noodle  -CJ (r)  (t) CJ (c)     Cueing 14  --  Verbal;Demo  -CJ (r)  (t) CJ (c)     Reps 14  --  10 GTB  -CJ (r)  (t) CJ (c)        Exercise 15    Exercise Name 15  --  Lateral doorway stretch  -CJ (r)  (t) CJ (c)     Cueing 15  --  Verbal  -CJ (r)  (t) CJ (c)     Reps 15  --  2  -CJ (r)  (t) CJ (c)     Time 15  --  15  -CJ (r)  (t) CJ (c)        Exercise 16    Exercise Name 16  --  scapular pushups   -CJ (r)  (t) CJ (c)     Cueing 16  --  Verbal;Tactile;Demo  -CJ (r)  (t) CJ (c)     Reps 16  --  15  -CJ (r)  (t) CJ (c)        Exercise 17    Exercise Name 17  --  standing ER with noodle  -CJ (r)  (t) CJ (c)     Cueing 17  --  Verbal;Demo  -CJ (r)  (t) CJ (c)     Reps 17  --  15  -CJ (r)  (t) CJ (c)     Additional Comments  --  GTB  -CJ (r)  (t) CJ (c)       User Key  (r) = Recorded By, (t) = Taken By, (c) = Cosigned By    Initials Name Provider Type    Roque Priest S, PT Physical Therapist    MH Kathy Barrera, PT Student PT Student                      Manual Rx (last 36 hours)      Manual Treatments     Row Name 03/20/20 1100             Total Minutes    28596 - PT Manual Therapy Minutes  6  -CJ         Manual Rx 1    Manual Rx 1 Location  dry cupping to L pectoral tissue and up into lateral cervical tissues  -CJ        User Key  (r) = Recorded By, (t) = Taken By, (c) = Cosigned By    Initials Name Provider Type    Roque Priest S, PT Physical Therapist          PT OP Goals     Row Name 03/20/20 1200          PT Short Term Goals    STG 1  Patient will be independent and compliant with initial home exercise program.  -FRANCO (debora) CHRISTOPHER (t) FRANCO (clif)     STG 1 Progress  Met  -FRANCO (debora) CHRISTOPHER (t) FRANCO (clif)     STG 2  Patient will be  independent with education for symptom management, joint protection and strategies to minimize stress on affected tissues  -CJ (r) MH (t) CJ (c)     STG 2 Progress  Met  -CJ (r) MH (t) CJ (c)        Long Term Goals    LTG 1  Patient will be independent and compliant with advanced home exercise program to facilitate self-management of symptoms  -CJ (r) MH (t) CJ (c)     LTG 1 Progress  Ongoing  -CJ (r) MH (t) CJ (c)     LTG 2  Pt. will report L shoulder pain </= 1-2/10 with all daily activities and sleeping.  -CJ (r) MH (t) CJ (c)     LTG 2 Progress  Ongoing  -CJ (r) MH (t) CJ (c)     LTG 2 Progress Comments  Reports 2/10 this session; will monitor for consistency  -CJ (r) MH (t) CJ (c)     LTG 3  Patient will be able to roll on to and sleep on L side without waking from increased pain or symptoms.   -CJ (r) MH (t) CJ (c)     LTG 3 Progress  Ongoing  -CJ (r) MH (t) CJ (c)     LTG 4  Patient will score </= 15/100 on QuickDASH score, indicating decreased perceived functional disability  -CJ (r) MH (t) CJ (c)     LTG 4 Progress  Ongoing  -CJ (r) MH (t) CJ (c)     LTG 5  Pt. will increase L shoulder AROM flexion and abduction to at least 0-160 deg with minimal substitution to increase ease of bathing, grooming, and dressing.  -CJ (r) MH (t) CJ (c)     LTG 5 Progress  Ongoing  -CJ (r) MH (t) CJ (c)     LTG 6  Patient will score </= 10% on Neck Disability Index, indicating improved perceived functional ability with daily activities.  -CJ (r) MH (t) CJ (c)     LTG 6 Progress  Ongoing  -CJ (r) MH (t) CJ (c)       User Key  (r) = Recorded By, (t) = Taken By, (c) = Cosigned By    Initials Name Provider Type    Roque Priest, PT Physical Therapist    Kathy Montague, PT Student PT Student          Therapy Education  Education Details: Continued to educate on importance of ice to control swelling post-workout and allow  for improved tissue healing  Program: Reinforced  How Provided: Verbal, Demonstration  Provided to:  Patient  Level of Understanding: Teach back education performed, Verbalized, Demonstrated              Time Calculation:   Start Time: 1000  Stop Time: 1050  Time Calculation (min): 50 min  Total Timed Code Minutes- PT: 43 minute(s)  Therapy Charges for Today     Code Description Service Date Service Provider Modifiers Qty    35160873010 HC PT THER PROC EA 15 MIN 3/20/2020 Kathy Barrera, PT Student GP 3                    Kathy Barrera, PT Student  3/20/2020

## 2020-03-24 ENCOUNTER — APPOINTMENT (OUTPATIENT)
Dept: PREADMISSION TESTING | Facility: HOSPITAL | Age: 35
End: 2020-03-24

## 2020-03-24 VITALS
BODY MASS INDEX: 21.19 KG/M2 | OXYGEN SATURATION: 100 % | HEART RATE: 77 BPM | WEIGHT: 174 LBS | RESPIRATION RATE: 20 BRPM | TEMPERATURE: 97.8 F | HEIGHT: 76 IN | SYSTOLIC BLOOD PRESSURE: 135 MMHG | DIASTOLIC BLOOD PRESSURE: 86 MMHG

## 2020-03-24 LAB
ALBUMIN SERPL-MCNC: 4.7 G/DL (ref 3.5–5.2)
ALBUMIN/GLOB SERPL: 2.2 G/DL
ALP SERPL-CCNC: 77 U/L (ref 39–117)
ALT SERPL W P-5'-P-CCNC: 28 U/L (ref 1–41)
ANION GAP SERPL CALCULATED.3IONS-SCNC: 11.5 MMOL/L (ref 5–15)
AST SERPL-CCNC: 25 U/L (ref 1–40)
BILIRUB SERPL-MCNC: 0.4 MG/DL (ref 0.2–1.2)
BUN BLD-MCNC: 21 MG/DL (ref 6–20)
BUN/CREAT SERPL: 24.1 (ref 7–25)
CALCIUM SPEC-SCNC: 9.1 MG/DL (ref 8.6–10.5)
CHLORIDE SERPL-SCNC: 100 MMOL/L (ref 98–107)
CO2 SERPL-SCNC: 27.5 MMOL/L (ref 22–29)
CREAT BLD-MCNC: 0.87 MG/DL (ref 0.76–1.27)
DEPRECATED RDW RBC AUTO: 41.2 FL (ref 37–54)
ERYTHROCYTE [DISTWIDTH] IN BLOOD BY AUTOMATED COUNT: 12.7 % (ref 12.3–15.4)
GFR SERPL CREATININE-BSD FRML MDRD: 101 ML/MIN/1.73
GLOBULIN UR ELPH-MCNC: 2.1 GM/DL
GLUCOSE BLD-MCNC: 80 MG/DL (ref 65–99)
HCT VFR BLD AUTO: 43.3 % (ref 37.5–51)
HGB BLD-MCNC: 15.5 G/DL (ref 13–17.7)
MCH RBC QN AUTO: 32.2 PG (ref 26.6–33)
MCHC RBC AUTO-ENTMCNC: 35.8 G/DL (ref 31.5–35.7)
MCV RBC AUTO: 90 FL (ref 79–97)
PLATELET # BLD AUTO: 197 10*3/MM3 (ref 140–450)
PMV BLD AUTO: 10.4 FL (ref 6–12)
POTASSIUM BLD-SCNC: 4.2 MMOL/L (ref 3.5–5.2)
PROT SERPL-MCNC: 6.8 G/DL (ref 6–8.5)
RBC # BLD AUTO: 4.81 10*6/MM3 (ref 4.14–5.8)
SODIUM BLD-SCNC: 139 MMOL/L (ref 136–145)
WBC NRBC COR # BLD: 7.83 10*3/MM3 (ref 3.4–10.8)

## 2020-03-24 PROCEDURE — 80053 COMPREHEN METABOLIC PANEL: CPT | Performed by: SURGERY

## 2020-03-24 PROCEDURE — 85027 COMPLETE CBC AUTOMATED: CPT | Performed by: SURGERY

## 2020-03-24 PROCEDURE — 36415 COLL VENOUS BLD VENIPUNCTURE: CPT

## 2020-03-27 ENCOUNTER — HOSPITAL ENCOUNTER (OUTPATIENT)
Dept: PHYSICAL THERAPY | Facility: HOSPITAL | Age: 35
Setting detail: THERAPIES SERIES
Discharge: HOME | End: 2020-03-27

## 2020-03-27 DIAGNOSIS — G54.0 THORACIC OUTLET SYNDROME: Primary | ICD-10-CM

## 2020-03-27 DIAGNOSIS — I87.1 VENOUS THORACIC OUTLET SYNDROME OF LEFT SUBCLAVIAN VEIN: ICD-10-CM

## 2020-03-27 DIAGNOSIS — Z47.89 ORTHOPEDIC AFTERCARE: ICD-10-CM

## 2020-03-27 DIAGNOSIS — I82.622 ACUTE DEEP VEIN THROMBOSIS (DVT) OF OTHER VEIN OF LEFT UPPER EXTREMITY (HCC): ICD-10-CM

## 2020-03-27 DIAGNOSIS — M79.602 CHRONIC PAIN OF LEFT UPPER EXTREMITY: ICD-10-CM

## 2020-03-27 DIAGNOSIS — G89.29 CHRONIC PAIN OF LEFT UPPER EXTREMITY: ICD-10-CM

## 2020-03-27 PROCEDURE — 97140 MANUAL THERAPY 1/> REGIONS: CPT

## 2020-03-27 PROCEDURE — 97110 THERAPEUTIC EXERCISES: CPT

## 2020-03-27 NOTE — THERAPY TREATMENT NOTE
Outpatient Physical Therapy Ortho Treatment Note  [facility]     Patient Name: Rad Baltazar  : 1985  MRN: [id number]  Today's Date: 3/27/2020      Visit Date: 2020    Visit Dx:    ICD-10-CM ICD-9-CM   1. Thoracic outlet syndrome G54.0 353.0   2. Chronic pain of left upper extremity M79.602 729.5    G89.29 338.29   3. Acute deep vein thrombosis (DVT) of other vein of left upper extremity (CMS/[facility]) I82.622 453.82   4. Orthopedic aftercare Z47.89 V54.9   5. Venous thoracic outlet syndrome of left subclavian vein I87.1 459.2       Patient Active Problem List   Diagnosis   • Venous thoracic outlet syndrome of left subclavian vein        Past Medical History:   Diagnosis Date   • Arm vein blood clot, left         Past Surgical History:   Procedure Laterality Date   • FIRST RIB RESECTION Left 2020    Procedure: LEFT FIRST RIB RESECTION;  Surgeon: Dejuan White MD;  Location: [facility] OR;  Service: Vascular;  Laterality: Left;   • VENOGRAM PERIPHERAL Left 2019    Procedure: LEFT ARM VENOGRAM AND THROMOLYSIS;  Surgeon: Dejuan White MD;  Location: [facility] OR ;  Service: Vascular   • WISDOM TOOTH EXTRACTION                         PT Assessment/Plan     Row Name 20 1300          PT Assessment    Assessment Comments  superior scar mobility more limited than inferior scar, adhesions noted near clavicle.  he demonstrates good understanding of ther ex with good form.  Pt is having sugery next week however he should be able to return to therapy.  He would benefit from a f/u in 2-3 weeks and we can assess then whether he is ready for D/C to [facility].  -SABINE        PT Plan    PT Plan Comments  assess scapular stabilization during shoulder elevated tasks  -SABINE       User Key  (r) = Recorded By, (t) = Taken By, (c) = Cosigned By    Initials Name Provider Type    Anna Fontaine, PT Physical Therapist            OP Exercises     Row Name 20 0800  "03/27/20 0700          Subjective Comments    Subjective Comments  Not much pain just a lot of tightness.  -JA  --        Subjective Pain    Able to rate subjective pain?  yes  -JA  --     Pre-Treatment Pain Level  2  -JA  --     Subjective Pain Comment  \"1.5\"  -JA  --        Total Minutes    82692 - PT Therapeutic Exercise Minutes  15  -JA  --     59388 - PT Manual Therapy Minutes  --  17  -JA        Exercise 1    Exercise Name 1  Thoracic stretch, blue noodle  -JA  --     Cueing 1  Verbal  -JA  --     Time 1  deep breathing 10x with thoracic cavity expansion  -JA  --        Exercise 2    Exercise Name 2  Serratus punches over thoracic roll  -JA  --     Cueing 2  Verbal  -JA  --     Sets 2  1  -JA  --     Reps 2  15  -JA  --     Time 2  5#  -JA  --        Exercise 3    Exercise Name 3  Pectoral stretch, supine over blue noodle  -JA  --     Cueing 3  Verbal  -JA  --     Reps 3  alternating arms in tolerated range x 15  -JA  --        Exercise 4    Exercise Name 4  UBE  -JA  --     Reps 4  fwd 2min, rev 2 min  -JA  --     Time 4  4 min  -JA  --        Exercise 5    Exercise Name 5  Sidelying thoracic rotation  -JA  --     Cueing 5  Verbal  -JA  --     Reps 5  3 B  -JA  --     Time 5  30 sec  -JA  --        Exercise 10    Exercise Name 10  Doorway stretch  -JA  --     Cueing 10  Verbal  -JA  --     Reps 10  3  -JA  --     Time 10  20  -JA  --       User Key  (r) = Recorded By, (t) = Taken By, (c) = Cosigned By    Initials Name Provider Type    Anna Fontaine, PT Physical Therapist                      Manual Rx (last 36 hours)      Manual Treatments     Row Name 03/27/20 0700             Total Minutes    41892 - PT Manual Therapy Minutes  17  -JA         Manual Rx 1    Manual Rx 1 Location  R sidelying, hugging a pillow  -JA      Manual Rx 1 Type  STM to L scapular complex and L cervical tissues, pecoral musculature, incision  -SABINE      Manual Rx 1 Grade  dry cupping to L pectoral tissue and scar for adhesion " work  -SABINE      Manual Rx 1 Duration  PNF scap pro/retr  -SABINE        User Key  (r) = Recorded By, (t) = Taken By, (c) = Cosigned By    Initials Name Provider Type    Anna Fontaine, PT Physical Therapist          PT OP Goals     Row Name 03/27/20 1300          PT Short Term Goals    STG 1  Patient will be independent and compliant with initial home exercise program.  -SABINE     STG 1 Progress  Met  -     STG 2  Patient will be independent with education for symptom management, joint protection and strategies to minimize stress on affected tissues  -SABINE     STG 2 Progress  Met  -SABINE        Long Term Goals    LTG 1  Patient will be independent and compliant with advanced home exercise program to facilitate self-management of symptoms  -     LTG 1 Progress  Ongoing  -     LTG 2  Pt. will report L shoulder pain </= 1-2/10 with all daily activities and sleeping.  -     LTG 2 Progress  Progressing;Partially Met  -SABINE     LTG 2 Progress Comments  has reported 2/10 for last 3 appts  -     LTG 3  Patient will be able to roll on to and sleep on L side without waking from increased pain or symptoms.   -     LTG 3 Progress  Ongoing  -JA     LTG 4  Patient will score </= 15/100 on QuickDASH score, indicating decreased perceived functional disability  -     LTG 4 Progress  Ongoing  -     LTG 5  Pt. will increase L shoulder AROM flexion and abduction to at least 0-160 deg with minimal substitution to increase ease of bathing, grooming, and dressing.  -     LTG 5 Progress  Ongoing  -JA     LTG 6  Patient will score </= 10% on Neck Disability Index, indicating improved perceived functional ability with daily activities.  -SABINE     LTG 6 Progress  Ongoing  -       User Key  (r) = Recorded By, (t) = Taken By, (c) = Cosigned By    Initials Name Provider Type    Anna Fontaine, PT Physical Therapist          Therapy Education  Education Details: Discussed continuing therapy to further decrease adhesions and  improve scar mobility and progress strengthning/scapular stability with elevated tasks.  Given: Posture/body mechanics  Program: Reinforced  How Provided: Verbal, Demonstration  Provided to: Patient  Level of Understanding: Teach back education performed              Time Calculation:   Start Time: 0800  Stop Time: 0832  Time Calculation (min): 32 min  Therapy Charges for Today     Code Description Service Date Service Provider Modifiers Qty    70179328082  PT THER PROC EA 15 MIN 3/27/2020 Anna Boykin, PT GP 1    [id number]  PT MANUAL THERAPY EA 15 MIN 3/27/2020 Anna Boykin PT GP 1                    Anna Boykin PT  3/27/2020

## 2020-04-06 ENCOUNTER — HOSPITAL ENCOUNTER (OUTPATIENT)
Dept: PHYSICAL THERAPY | Facility: HOSPITAL | Age: 35
Setting detail: THERAPIES SERIES
Discharge: HOME | End: 2020-04-06

## 2020-04-06 DIAGNOSIS — Z47.89 ORTHOPEDIC AFTERCARE: ICD-10-CM

## 2020-04-06 DIAGNOSIS — G89.29 CHRONIC PAIN OF LEFT UPPER EXTREMITY: ICD-10-CM

## 2020-04-06 DIAGNOSIS — M79.602 CHRONIC PAIN OF LEFT UPPER EXTREMITY: ICD-10-CM

## 2020-04-06 DIAGNOSIS — I87.1 VENOUS THORACIC OUTLET SYNDROME OF LEFT SUBCLAVIAN VEIN: ICD-10-CM

## 2020-04-06 DIAGNOSIS — G54.0 THORACIC OUTLET SYNDROME: Primary | ICD-10-CM

## 2020-04-06 DIAGNOSIS — I82.622 ACUTE DEEP VEIN THROMBOSIS (DVT) OF OTHER VEIN OF LEFT UPPER EXTREMITY (HCC): ICD-10-CM

## 2020-04-06 PROCEDURE — 97140 MANUAL THERAPY 1/> REGIONS: CPT

## 2020-04-06 PROCEDURE — 97110 THERAPEUTIC EXERCISES: CPT

## 2020-04-06 NOTE — THERAPY PROGRESS REPORT/RE-CERT
Outpatient Physical Therapy Ortho Progress Note  [facility]     Patient Name: Rad Baltazar  : 1985  MRN: [id number]  Today's Date: 2020      Visit Date: 2020    Visit Dx:    ICD-10-CM ICD-9-CM   1. Thoracic outlet syndrome G54.0 353.0   2. Chronic pain of left upper extremity M79.602 729.5    G89.29 338.29   3. Acute deep vein thrombosis (DVT) of other vein of left upper extremity (CMS/[facility]) I82.622 453.82   4. Orthopedic aftercare Z47.89 V54.9   5. Venous thoracic outlet syndrome of left subclavian vein I87.1 459.2       Patient Active Problem List   Diagnosis   • Venous thoracic outlet syndrome of left subclavian vein        Past Medical History:   Diagnosis Date   • Arm vein blood clot, left         Past Surgical History:   Procedure Laterality Date   • FIRST RIB RESECTION Left 2020    Procedure: LEFT FIRST RIB RESECTION;  Surgeon: Dejuan White MD;  Location: [facility] OR;  Service: Vascular;  Laterality: Left;   • VENOGRAM PERIPHERAL Left 2019    Procedure: LEFT ARM VENOGRAM AND THROMOLYSIS;  Surgeon: Dejuan White MD;  Location: [facility] OR ;  Service: Vascular   • WISDOM TOOTH EXTRACTION         PT Ortho     Row Name 20 1400       Myotomal Screen- Upper Quarter Clearing    Shoulder flexion (C5)  Bilateral:;5 (Normal)  -RS    Elbow flexion/wrist extension (C6)  Right:;5 (Normal);Left:;4+ (Good +)  -RS    Elbow extension/wrist flexion (C7)  Right:;5 (Normal);Left:;4+ (Good +)  -RS       Cervical Palpation    Scalenes  Left:;Tender  -RS    Levator Scapula  Left:;Tender;Swollen  -RS    Upper Traps  Left:;Tender;Swollen  -RS       Left Upper Ext    Lt Shoulder Abduction AROM  0-160  -RS    Lt Shoulder Flexion AROM  0-175  -RS    Lt Shoulder External Rotation AROM  WNL  -RS    Lt Shoulder Internal Rotation AROM  WNL  -RS       MMT Left Upper Ext    Lt Shoulder Flexion MMT, Gross Movement  (4+/5) good plus  -RS    Lt Shoulder ABduction  MMT, Gross Movement  (4/5) good  -RS    Lt Shoulder Internal Rotation MMT, Gross Movement  (4/5) good  -RS    Lt Shoulder External Rotation MMT, Gross Movement  (4/5) good  -RS      User Key  (r) = Recorded By, (t) = Taken By, (c) = Cosigned By    Initials Name Provider Type    RS Kenisha Reese, PT Physical Therapist                      PT Assessment/Plan     Row Name 04/06/20 1400          PT Assessment    Functional Limitations  Limitations in functional capacity and performance;Performance in sport activities;Performance in self-care ADL;Performance in work activities;Performance in leisure activities;Limitations in community activities;Limitation in home management  -RS     Impairments  Impaired flexibility;Peripheral nerve integrity;Posture;Range of motion;Pain;Muscle strength;Impaired muscle endurance;Impaired muscle length  -RS     Assessment Comments  The pt is a 35 yo male who has been seen for TOS, he previously had a procedure scheduled to removed a bloot clot, however that was postponed due to COVID 19. He has met all STG and most LTG with reports of little to no pain with typical daily activities that are below shoulder level. He demonstrates improved L shoulder AROM and MMT strength as well as improved pain tolerance, he continues to report numbness, however limited by vasculature/ blood clot. His QuickDASH and NDI scores have decreased significantly. At this point, plan to hold on PT secondary to COVID 19 and resume, focused on manual therapy after procedure to remove clot in order to maximize function. Pt receptive.  -RS     Please refer to paper survey for additional self-reported information  Yes  -RS     Rehab Potential  Good  -RS     Patient/caregiver participated in establishment of treatment plan and goals  Yes  -RS     Patient would benefit from skilled therapy intervention  Yes  -RS        PT Plan    PT Plan Comments  Hold on PT currently due to Covid-19 and limited staff/hours to  provide consistent manual therapy/ secondary to MD postponing blood clot removal surgery.  -RS       User Key  (r) = Recorded By, (t) = Taken By, (c) = Cosigned By    Initials Name Provider Type    RS Kenisha Reese, PT Physical Therapist            OP Exercises     Row Name 04/06/20 1300             Subjective Comments    Subjective Comments  Not having much pain with typical day to day activities, he squatted too much and was sore for about a day and hurt for a day and a half.  -RS         Subjective Pain    Able to rate subjective pain?  yes  -RS      Pre-Treatment Pain Level  0  -RS         Total Minutes    51217 - PT Therapeutic Exercise Minutes  20  -RS      71515 - PT Manual Therapy Minutes  10  -RS         Exercise 1    Exercise Name 1  Thoracic stretch, blue noodle  -RS      Cueing 1  Verbal  -RS      Time 1  deep breathing 10x with thoracic cavity expansion  -RS         Exercise 2    Exercise Name 2  --  -RS      Cueing 2  --  -RS      Sets 2  --  -RS      Reps 2  --  -RS      Time 2  --  -RS         Exercise 3    Exercise Name 3  Pectoral stretch, supine over blue noodle  -RS      Cueing 3  Verbal  -RS      Reps 3  alternating arms in tolerated range x 15  -RS         Exercise 4    Exercise Name 4  UBE  -RS      Reps 4  fwd 2min, rev 2 min  -RS      Time 4  2  min  -RS         Exercise 5    Exercise Name 5  Sidelying thoracic rotation  -RS      Cueing 5  Verbal  -RS      Reps 5  3 B  -RS      Time 5  30 sec  -RS         Exercise 6    Exercise Name 6  Lat Pulls, tuff sstuff  -RS      Cueing 6  Verbal;Tactile  -RS      Reps 6  15  -RS      Additional Comments  7 plates  -RS         Exercise 7    Exercise Name 7  single arm Rows on Tuff Stuff  -RS      Cueing 7  Verbal  -RS      Sets 7  2  -RS      Reps 7  15  -RS      Time 7  5 plates  -RS         Exercise 10    Exercise Name 10  Doorway stretch  -RS      Cueing 10  Verbal  -RS      Reps 10  3  -RS      Time 10  20  -RS        User Key  (r) = Recorded  By, (t) = Taken By, (c) = Cosigned By    Initials Name Provider Type    RS Kenisha Reese, PT Physical Therapist                      Manual Rx (last 36 hours)      Manual Treatments     Row Name 04/06/20 1300             Total Minutes    71122 - PT Manual Therapy Minutes  10  -RS         Manual Rx 1    Manual Rx 1 Location  supine STM L scalenes/ UT  -RS      Manual Rx 1 Type  manual stretch  -RS        User Key  (r) = Recorded By, (t) = Taken By, (c) = Cosigned By    Initials Name Provider Type    RS Kenisha Reese, PT Physical Therapist          PT OP Goals     Row Name 04/06/20 1300          PT Short Term Goals    STG 1  Patient will be independent and compliant with initial home exercise program.  -RS     STG 1 Progress  Met  -RS     STG 2  Patient will be independent with education for symptom management, joint protection and strategies to minimize stress on affected tissues  -RS     STG 2 Progress  Met  -RS        Long Term Goals    LTG 1  Patient will be independent and compliant with advanced home exercise program to facilitate self-management of symptoms  -RS     LTG 1 Progress  Ongoing;Progressing  -RS     LTG 2  Pt. will report L shoulder pain </= 1-2/10 with all daily activities and sleeping.  -RS     LTG 2 Progress  Met  -RS     LTG 2 Progress Comments  0/10 with typical ADLs  -RS     LTG 3  Patient will be able to roll on to and sleep on L side without waking from increased pain or symptoms.   -RS     LTG 3 Progress  Partially Met  -RS     LTG 4  Patient will score </= 15/100 on QuickDASH score, indicating decreased perceived functional disability  -RS     LTG 4 Progress  Partially Met  -RS     LTG 4 Progress Comments  18%  -RS     LTG 5  Pt. will increase L shoulder AROM flexion and abduction to at least 0-160 deg with minimal substitution to increase ease of bathing, grooming, and dressing.  -RS     LTG 5 Progress  Met  -RS     LTG 6  Patient will score </= 10% on Neck Disability Index,  indicating improved perceived functional ability with daily activities.  -RS     LTG 6 Progress  Met  -RS     LTG 6 Progress Comments  10% disability  -RS       User Key  (r) = Recorded By, (t) = Taken By, (c) = Cosigned By    Initials Name Provider Type    Kenisha Madrigal PT Physical Therapist               Outcome Measure Options: Quick DASH, Neck Disability Index (NDI)  Quick DASH  Open a tight or new jar.: Mild Difficulty  Do heavy household chores (e.g., wash walls, wash floors): Mild Difficulty  Carry a shopping bag or briefcase: Mild Difficulty  Wash your back: Mild Difficulty  Use a knife to cut food: Mild Difficulty  Recreational activities in which you take some force or impact through your arm, should or hand (e.g. golf, hammering, tennis, etc.): Mild Difficulty  During the past week, to what extent has your arm, shoulder, or hand problem interfered with your normal social activites with family, friends, neighbors or groups?: Not at all  During the past week, were you limited in your work or other regular daily activities as a result of your arm, shoulder or hand problem?: Slightly Limited  Arm, Shoulder, or hand pain: None  Tingling (pins and needles) in your arm, shoulder, or hand: Mild  During the past week, how much difficulty have you had sleeping because of the pain in your arm, shoulder or hand?: No difficulty  Number of Questions Answered: 11  Quick DASH Score: 18.18  Neck Disability Index  Section 1 - Pain Intensity: I have no pain at the moment.  Section 2 - Personal Care: I can look after myself normally, but it causes extra pain.  Section 3 - Lifting: I can lift heavy weights, but it gives me extra pain.  Section 4 - Work: I can only do my usual work, but no more  Section 5 - Headaches: I have no headaches at all.  Section 6 - Concentration: I can concentrate fully with slight difficulty.  Section 7 - Sleeping: I have no trouble sleeping.  Section 8 - Driving: I can drive my car without  neck pain  Section 9 - Reading: I can read as much as I want with no neck pain.  Section 10 - Recreation: I have some neck pain with all recreational activities.  Neck Disability Index Score: 5      Time Calculation:   Start Time: 1340  Stop Time: 1415  Time Calculation (min): 35 min  Therapy Charges for Today     Code Description Service Date Service Provider Modifiers Qty    21294779995 HC PT THER PROC EA 15 MIN 4/6/2020 Kenisha Reese, PT GP 1    31193520751 HC PT MANUAL THERAPY EA 15 MIN 4/6/2020 Kenisha Reese, PT GP 1          PT G-Codes  Outcome Measure Options: Quick DASH, Neck Disability Index (NDI)  Quick DASH Score: 18.18  Neck Disability Index Score: 5         Kenisha Reese, ELISHA  4/6/2020

## 2020-04-15 ENCOUNTER — APPOINTMENT (OUTPATIENT)
Dept: PHYSICAL THERAPY | Facility: HOSPITAL | Age: 35
End: 2020-04-15

## 2020-05-06 ENCOUNTER — APPOINTMENT (OUTPATIENT)
Dept: PREADMISSION TESTING | Facility: HOSPITAL | Age: 35
End: 2020-05-06

## 2020-05-06 VITALS
BODY MASS INDEX: 22.04 KG/M2 | HEIGHT: 76 IN | SYSTOLIC BLOOD PRESSURE: 120 MMHG | WEIGHT: 181 LBS | TEMPERATURE: 98.2 F | DIASTOLIC BLOOD PRESSURE: 73 MMHG | HEART RATE: 78 BPM | OXYGEN SATURATION: 97 % | RESPIRATION RATE: 16 BRPM

## 2020-05-06 LAB
ANION GAP SERPL CALCULATED.3IONS-SCNC: 13.9 MMOL/L (ref 5–15)
BUN BLD-MCNC: 23 MG/DL (ref 6–20)
BUN/CREAT SERPL: 25 (ref 7–25)
CALCIUM SPEC-SCNC: 9 MG/DL (ref 8.6–10.5)
CHLORIDE SERPL-SCNC: 103 MMOL/L (ref 98–107)
CO2 SERPL-SCNC: 23.1 MMOL/L (ref 22–29)
CREAT BLD-MCNC: 0.92 MG/DL (ref 0.76–1.27)
DEPRECATED RDW RBC AUTO: 41 FL (ref 37–54)
ERYTHROCYTE [DISTWIDTH] IN BLOOD BY AUTOMATED COUNT: 12.7 % (ref 12.3–15.4)
GFR SERPL CREATININE-BSD FRML MDRD: 95 ML/MIN/1.73
GLUCOSE BLD-MCNC: 102 MG/DL (ref 65–99)
HCT VFR BLD AUTO: 45.4 % (ref 37.5–51)
HGB BLD-MCNC: 16.1 G/DL (ref 13–17.7)
MCH RBC QN AUTO: 31.9 PG (ref 26.6–33)
MCHC RBC AUTO-ENTMCNC: 35.5 G/DL (ref 31.5–35.7)
MCV RBC AUTO: 90.1 FL (ref 79–97)
PLATELET # BLD AUTO: 202 10*3/MM3 (ref 140–450)
PMV BLD AUTO: 10.1 FL (ref 6–12)
POTASSIUM BLD-SCNC: 4.1 MMOL/L (ref 3.5–5.2)
RBC # BLD AUTO: 5.04 10*6/MM3 (ref 4.14–5.8)
SODIUM BLD-SCNC: 140 MMOL/L (ref 136–145)
WBC NRBC COR # BLD: 7.37 10*3/MM3 (ref 3.4–10.8)

## 2020-05-06 PROCEDURE — 36415 COLL VENOUS BLD VENIPUNCTURE: CPT

## 2020-05-06 PROCEDURE — 85027 COMPLETE CBC AUTOMATED: CPT | Performed by: SURGERY

## 2020-05-06 PROCEDURE — 80048 BASIC METABOLIC PNL TOTAL CA: CPT | Performed by: SURGERY

## 2020-05-06 NOTE — DISCHARGE INSTRUCTIONS
Take the following medications the morning of surgery:    None, bring in propanolol with you day of procedure    Arrive at 10:00    General Instructions:  • Do not eat solid food after midnight the night before surgery.  • You may drink clear liquids day of surgery but must stop at least one hour before your hospital arrival time.  • It is beneficial for you to have a clear drink that contains carbohydrates the day of surgery.  We suggest a 12 to 20 ounce bottle of Gatorade or Powerade for non-diabetic patients or a 12 to 20 ounce bottle of G2 or Powerade Zero for diabetic patients. (Pediatric patients, are not advised to drink a 12 to 20 ounce carbohydrate drink)    Clear liquids are liquids you can see through.  Nothing red in color.     Plain water                               Sports drinks  Sodas                                   Gelatin (Jell-O)  Fruit juices without pulp such as white grape juice and apple juice  Popsicles that contain no fruit or yogurt  Tea or coffee (no cream or milk added)  Gatorade / Powerade  G2 / Powerade Zero    • Infants may have breast milk up to four hours before surgery.  • Infants drinking formula may drink formula up to six hours before surgery.   • Patients who avoid smoking, chewing tobacco and alcohol for 4 weeks prior to surgery have a reduced risk of post-operative complications.  Quit smoking as many days before surgery as you can.  • Do not smoke, use chewing tobacco or drink alcohol the day of surgery.   • If applicable bring your C-PAP/ BI-PAP machine.  • Bring any papers given to you in the doctor’s office.  • Wear clean comfortable clothes.  • Do not wear contact lenses, false eyelashes or make-up.  Bring a case for your glasses.   • Bring crutches or walker if applicable.  • Remove all piercings.  Leave jewelry and any other valuables at home.  • Hair extensions with metal clips must be removed prior to surgery.  • The Pre-Admission Testing nurse will instruct you to  bring medications if unable to obtain an accurate list in Pre-Admission Testing.        If you were given a blood bank ID arm band remember to bring it with you the day of surgery.    Preventing a Surgical Site Infection:  • For 2 to 3 days before surgery, avoid shaving with a razor because the razor can irritate skin and make it easier to develop an infection.    • Any areas of open skin can increase the risk of a post-operative wound infection by allowing bacteria to enter and travel throughout the body.  Notify your surgeon if you have any skin wounds / rashes even if it is not near the expected surgical site.  The area will need assessed to determine if surgery should be delayed until it is healed.  • The night prior to surgery shower using a fresh bar of anti-bacterial soap (such as Dial) and clean washcloth.  Sleep in a clean bed with clean clothing.  Do not allow pets to sleep with you.  • Shower on the morning of surgery using a fresh bar of anti-bacterial soap (such as Dial) and clean washcloth.  Dry with a clean towel and dress in clean clothing.  • Ask your surgeon if you will be receiving antibiotics prior to surgery.  • Make sure you, your family, and all healthcare providers clean their hands with soap and water or an alcohol based hand  before caring for you or your wound.    Day of surgery:  Your arrival time is approximately two hours before your scheduled surgery time.  Upon arrival, a Pre-op nurse and Anesthesiologist will review your health history, obtain vital signs, and answer questions you may have.  The only belongings needed at this time will be a list of your home medications and if applicable your C-PAP/BI-PAP machine.  If you are staying overnight your family can leave the rest of your belongings in the car and bring them to your room later.  A Pre-op nurse will start an IV and you may receive medication in preparation for surgery, including something to help you relax.  Your  family will be able to see you in the Pre-op area.  Two visitors at a time will be allowed in the Pre-op room.  While you are in surgery your family should notify the waiting room  if they leave the waiting room area and provide a contact phone number.    Please be aware that surgery does come with discomfort.  We want to make every effort to control your discomfort so please discuss any uncontrolled symptoms with your nurse.   Your doctor will most likely have prescribed pain medications.      If you are going home after surgery you will receive individualized written care instructions before being discharged.  A responsible adult must drive you to and from the hospital on the day of your surgery and stay with you for 24 hours.    If you are staying overnight following surgery, you will be transported to your hospital room following the recovery period.  [facility] has all private rooms.    If you have any questions please call Pre-Admission Testing at [phone].  Deductibles and co-payments are collected on the day of service. Please be prepared to pay the required co-pay, deductible or deposit on the day of service as defined by your plan.    Self-Quarantine Prior to Surgery    • Stay at home. Do not leave your home after you have been given the Covid test for your upcoming surgery.   • Wash your hands often with soap and water for at least 20 seconds especially after you have been in a public place, or after blowing your nose, coughing, or sneezing.  • If soap and water are not readily available, use a hand  that contains at least 60% alcohol. Cover all surfaces of your hands and rub them together until they feel dry.  • Avoid touching your eyes, nose, and mouth with unwashed hands.  • Take everyday precautions to keep space between yourself and others (stay 6 feet away, which is about two arm lengths).  Remember that some people without symptoms may be able to spread  virus.  • You should stay in a specific room away from others if possible.  Stay at least 6 feet away from others in the home if you cannot have a dedicated room to yourself. Do not have visitors.   • Wear a mask around others in your home if you are unable to stay in a separate room or 6 feet apart. If you are unable to wear a mask, have your family member wear a mask if they must be within 6 feet of you.   • Do not snuggle with your pet. While the CDC says there is no evidence that pets can spread COVID-19 or be infected from humans, it is probably best to avoid “petting, snuggling, being kissed or licked and sharing food (during self-quarantine)”, according to the CDC.   • Do not share anything - Have your own utensils, drinking glass, dishes, towels and bedding.   • Do not share a bathroom with others, if possible.   • Clean and disinfect frequently touched services daily. This includes tables, doorknobs, light switches, countertops, handles, desks, phones, keyboards, toilets, faucets, and sinks.  • Do not travel prior to surgery.    Call your surgeon immediately if you experience any of the following symptoms:  • Sore Throat      • Shortness of Breath or difficulty breathing  • Cough  • Chills  • Body soreness or muscle pain  • Headache  • Fever  • New loss of taste or smell  • Do not arrive for your surgery ill.  Your procedure will need to be rescheduled to another time.  You will need to call your physician before the day of surgery to avoid any unnecessary exposure to hospital staff as well as other patients.    CHLORHEXIDINE CLOTH INSTRUCTIONS  The morning of surgery follow these instructions using the Chlorhexidine cloths you've been given.  These steps reduce bacteria on the body.  Do not use the cloths near your eyes, ears mouth, genitalia or on open wounds.  Throw the cloths away after use but do not try to flush them down a toilet.      • Open and remove one cloth at a time from the package.     • Leave the cloth unfolded and begin the bathing.  • Massage the skin with the cloths using gentle pressure to remove bacteria.  Do not scrub harshly.   • Follow the steps below with one 2% CHG cloth per area (6 total cloths).  • One cloth for neck, shoulders and chest.  • One cloth for both arms, hands, fingers and underarms (do underarms last).  • One cloth for the abdomen followed by groin.  • One cloth for right leg and foot including between the toes.  • One cloth for left leg and foot including between the toes.  • The last cloth is to be used for the back of the neck, back and buttocks.    Allow the CHG to air dry 3 minutes on the skin which will give it time to work and decrease the chance of irritation.  The skin may feel sticky until it is dry.  Do not rinse with water or any other liquid or you will lose the beneficial effects of the CHG.  If mild skin irritation occurs, do rinse the skin to remove the CHG.  Report this to the nurse at time of admission.  Do not apply lotions, creams, ointments, deodorants or perfumes after using the clothes. Dress in clean clothes before coming to the hospital.

## 2020-05-07 ENCOUNTER — TRANSCRIBE ORDERS (OUTPATIENT)
Dept: ADMINISTRATIVE | Facility: HOSPITAL | Age: 35
End: 2020-05-07

## 2020-05-07 ENCOUNTER — APPOINTMENT (OUTPATIENT)
Dept: LAB | Facility: HOSPITAL | Age: 35
End: 2020-05-07

## 2020-05-07 DIAGNOSIS — Z01.818 PRE-OP EXAM: Primary | ICD-10-CM

## 2020-05-07 LAB — SARS-COV-2 RNA RESP QL NAA+PROBE: NOT DETECTED

## 2020-05-07 PROCEDURE — 87635 SARS-COV-2 COVID-19 AMP PRB: CPT | Performed by: NURSE PRACTITIONER

## 2020-05-08 ENCOUNTER — ANESTHESIA (OUTPATIENT)
Dept: PERIOP | Facility: HOSPITAL | Age: 35
End: 2020-05-08

## 2020-05-08 ENCOUNTER — HOSPITAL ENCOUNTER (OUTPATIENT)
Facility: HOSPITAL | Age: 35
Setting detail: HOSPITAL OUTPATIENT SURGERY
Discharge: HOME | End: 2020-05-08
Attending: SURGERY | Admitting: SURGERY

## 2020-05-08 ENCOUNTER — ANESTHESIA EVENT (OUTPATIENT)
Dept: PERIOP | Facility: HOSPITAL | Age: 35
End: 2020-05-08

## 2020-05-08 ENCOUNTER — APPOINTMENT (OUTPATIENT)
Dept: GENERAL RADIOLOGY | Facility: HOSPITAL | Age: 35
End: 2020-05-08

## 2020-05-08 VITALS
TEMPERATURE: 98.7 F | RESPIRATION RATE: 16 BRPM | DIASTOLIC BLOOD PRESSURE: 78 MMHG | HEART RATE: 80 BPM | OXYGEN SATURATION: 97 % | BODY MASS INDEX: 21.88 KG/M2 | HEIGHT: 76 IN | SYSTOLIC BLOOD PRESSURE: 118 MMHG | WEIGHT: 179.68 LBS

## 2020-05-08 PROCEDURE — 75820 VEIN X-RAY ARM/LEG: CPT

## 2020-05-08 PROCEDURE — 25010000002 MIDAZOLAM PER 1 MG: Performed by: ANESTHESIOLOGY

## 2020-05-08 PROCEDURE — 25010000003 LIDOCAINE 1 % SOLUTION 20 ML VIAL: Performed by: SURGERY

## 2020-05-08 PROCEDURE — C1894 INTRO/SHEATH, NON-LASER: HCPCS | Performed by: SURGERY

## 2020-05-08 PROCEDURE — 25010000002 PROPOFOL 10 MG/ML EMULSION: Performed by: ANESTHESIOLOGY

## 2020-05-08 PROCEDURE — C1725 CATH, TRANSLUMIN NON-LASER: HCPCS | Performed by: SURGERY

## 2020-05-08 PROCEDURE — C1887 CATHETER, GUIDING: HCPCS | Performed by: SURGERY

## 2020-05-08 PROCEDURE — 25010000002 HEPARIN (PORCINE) PER 1000 UNITS: Performed by: SURGERY

## 2020-05-08 PROCEDURE — 0 IOPAMIDOL PER 1 ML: Performed by: SURGERY

## 2020-05-08 PROCEDURE — 25010000003 CEFAZOLIN IN DEXTROSE 2-4 GM/100ML-% SOLUTION: Performed by: SURGERY

## 2020-05-08 PROCEDURE — 25010000002 HEPARIN (PORCINE) PER 1000 UNITS: Performed by: ANESTHESIOLOGY

## 2020-05-08 PROCEDURE — C1769 GUIDE WIRE: HCPCS | Performed by: SURGERY

## 2020-05-08 RX ORDER — SODIUM CHLORIDE 0.9 % (FLUSH) 0.9 %
3 SYRINGE (ML) INJECTION EVERY 12 HOURS SCHEDULED
Status: DISCONTINUED | OUTPATIENT
Start: 2020-05-08 | End: 2020-05-08 | Stop reason: HOSPADM

## 2020-05-08 RX ORDER — SODIUM CHLORIDE 0.9 % (FLUSH) 0.9 %
3-10 SYRINGE (ML) INJECTION AS NEEDED
Status: DISCONTINUED | OUTPATIENT
Start: 2020-05-08 | End: 2020-05-08 | Stop reason: HOSPADM

## 2020-05-08 RX ORDER — HEPARIN SODIUM 1000 [USP'U]/ML
INJECTION, SOLUTION INTRAVENOUS; SUBCUTANEOUS AS NEEDED
Status: DISCONTINUED | OUTPATIENT
Start: 2020-05-08 | End: 2020-05-08 | Stop reason: SURG

## 2020-05-08 RX ORDER — ACETAMINOPHEN 325 MG/1
650 TABLET ORAL EVERY 4 HOURS PRN
Status: DISCONTINUED | OUTPATIENT
Start: 2020-05-08 | End: 2020-05-08 | Stop reason: HOSPADM

## 2020-05-08 RX ORDER — LIDOCAINE HYDROCHLORIDE 10 MG/ML
0.5 INJECTION, SOLUTION EPIDURAL; INFILTRATION; INTRACAUDAL; PERINEURAL ONCE AS NEEDED
Status: DISCONTINUED | OUTPATIENT
Start: 2020-05-08 | End: 2020-05-08 | Stop reason: HOSPADM

## 2020-05-08 RX ORDER — PROPOFOL 10 MG/ML
VIAL (ML) INTRAVENOUS CONTINUOUS PRN
Status: DISCONTINUED | OUTPATIENT
Start: 2020-05-08 | End: 2020-05-08 | Stop reason: SURG

## 2020-05-08 RX ORDER — SODIUM CHLORIDE, SODIUM LACTATE, POTASSIUM CHLORIDE, CALCIUM CHLORIDE 600; 310; 30; 20 MG/100ML; MG/100ML; MG/100ML; MG/100ML
9 INJECTION, SOLUTION INTRAVENOUS CONTINUOUS
Status: DISCONTINUED | OUTPATIENT
Start: 2020-05-08 | End: 2020-05-08 | Stop reason: HOSPADM

## 2020-05-08 RX ORDER — FENTANYL CITRATE 50 UG/ML
50 INJECTION, SOLUTION INTRAMUSCULAR; INTRAVENOUS
Status: DISCONTINUED | OUTPATIENT
Start: 2020-05-08 | End: 2020-05-08 | Stop reason: HOSPADM

## 2020-05-08 RX ORDER — SODIUM CHLORIDE 0.9 % (FLUSH) 0.9 %
10 SYRINGE (ML) INJECTION AS NEEDED
Status: DISCONTINUED | OUTPATIENT
Start: 2020-05-08 | End: 2020-05-08 | Stop reason: HOSPADM

## 2020-05-08 RX ORDER — CEFAZOLIN SODIUM 2 G/100ML
2 INJECTION, SOLUTION INTRAVENOUS ONCE
Status: COMPLETED | OUTPATIENT
Start: 2020-05-08 | End: 2020-05-08

## 2020-05-08 RX ORDER — SODIUM CHLORIDE, SODIUM LACTATE, POTASSIUM CHLORIDE, CALCIUM CHLORIDE 600; 310; 30; 20 MG/100ML; MG/100ML; MG/100ML; MG/100ML
INJECTION, SOLUTION INTRAVENOUS CONTINUOUS PRN
Status: DISCONTINUED | OUTPATIENT
Start: 2020-05-08 | End: 2020-05-08 | Stop reason: SURG

## 2020-05-08 RX ORDER — HYDROCODONE BITARTRATE AND ACETAMINOPHEN 5; 325 MG/1; MG/1
1 TABLET ORAL EVERY 4 HOURS PRN
Status: DISCONTINUED | OUTPATIENT
Start: 2020-05-08 | End: 2020-05-08 | Stop reason: HOSPADM

## 2020-05-08 RX ORDER — MIDAZOLAM HYDROCHLORIDE 1 MG/ML
1 INJECTION INTRAMUSCULAR; INTRAVENOUS
Status: DISCONTINUED | OUTPATIENT
Start: 2020-05-08 | End: 2020-05-08 | Stop reason: HOSPADM

## 2020-05-08 RX ORDER — PROPOFOL 10 MG/ML
VIAL (ML) INTRAVENOUS AS NEEDED
Status: DISCONTINUED | OUTPATIENT
Start: 2020-05-08 | End: 2020-05-08 | Stop reason: SURG

## 2020-05-08 RX ORDER — FAMOTIDINE 10 MG/ML
20 INJECTION, SOLUTION INTRAVENOUS ONCE
Status: COMPLETED | OUTPATIENT
Start: 2020-05-08 | End: 2020-05-08

## 2020-05-08 RX ORDER — LIDOCAINE HYDROCHLORIDE 20 MG/ML
INJECTION, SOLUTION INFILTRATION; PERINEURAL AS NEEDED
Status: DISCONTINUED | OUTPATIENT
Start: 2020-05-08 | End: 2020-05-08 | Stop reason: SURG

## 2020-05-08 RX ORDER — MIDAZOLAM HYDROCHLORIDE 1 MG/ML
2 INJECTION INTRAMUSCULAR; INTRAVENOUS
Status: DISCONTINUED | OUTPATIENT
Start: 2020-05-08 | End: 2020-05-08 | Stop reason: HOSPADM

## 2020-05-08 RX ADMIN — SODIUM CHLORIDE, POTASSIUM CHLORIDE, SODIUM LACTATE AND CALCIUM CHLORIDE 9 ML/HR: 600; 310; 30; 20 INJECTION, SOLUTION INTRAVENOUS at 08:37

## 2020-05-08 RX ADMIN — SODIUM CHLORIDE, POTASSIUM CHLORIDE, SODIUM LACTATE AND CALCIUM CHLORIDE: 600; 310; 30; 20 INJECTION, SOLUTION INTRAVENOUS at 09:15

## 2020-05-08 RX ADMIN — IOPAMIDOL 58 ML: 510 INJECTION, SOLUTION INTRAVASCULAR at 09:50

## 2020-05-08 RX ADMIN — PROPOFOL 100 MG: 10 INJECTION, EMULSION INTRAVENOUS at 09:15

## 2020-05-08 RX ADMIN — HEPARIN SODIUM 5000 UNITS: 1000 INJECTION, SOLUTION INTRAVENOUS; SUBCUTANEOUS at 09:28

## 2020-05-08 RX ADMIN — MIDAZOLAM 1 MG: 1 INJECTION INTRAMUSCULAR; INTRAVENOUS at 08:38

## 2020-05-08 RX ADMIN — CEFAZOLIN SODIUM 2 G: 2 INJECTION, SOLUTION INTRAVENOUS at 09:11

## 2020-05-08 RX ADMIN — LIDOCAINE HYDROCHLORIDE 100 MG: 20 INJECTION, SOLUTION INFILTRATION; PERINEURAL at 09:15

## 2020-05-08 RX ADMIN — FAMOTIDINE 20 MG: 10 INJECTION, SOLUTION INTRAVENOUS at 08:38

## 2020-05-08 RX ADMIN — PROPOFOL 200 MCG/KG/MIN: 10 INJECTION, EMULSION INTRAVENOUS at 09:15

## 2020-05-08 NOTE — PERIOPERATIVE NURSING NOTE
SPOKE WITH ELLEN RAMIREZ AND SHE REPORTS DR. HOOPER TOLD HER FOR PATIENT TO RESUME ELIQUIS TOMORROW AND THEN FOR ANOTHER MONTH.

## 2020-05-08 NOTE — ANESTHESIA PREPROCEDURE EVALUATION
Anesthesia Evaluation     Patient summary reviewed and Nursing notes reviewed   no history of anesthetic complications:  NPO Solid Status: > 8 hours  NPO Liquid Status: > 2 hours           Airway   Mallampati: II  TM distance: >3 FB  Neck ROM: full  no difficulty expected  Dental - normal exam     Pulmonary - normal exam   (+) a smoker Former,   (-) COPD, asthma, lung cancer, no home oxygen  Cardiovascular - negative cardio ROS and normal exam  Exercise tolerance: excellent (>7 METS)    Rhythm: regular  Rate: normal    (-) hypertension, valvular problems/murmurs, past MI, CAD, dysrhythmias, angina, CHF, cardiac stents, CABG, pericardial effusion      Neuro/Psych  (+) psychiatric history Anxiety,     (-) seizures, TIA, CVA  GI/Hepatic/Renal/Endo - negative ROS   (-) hiatal hernia, GERD, PUD, hepatitis, liver disease, no renal disease, diabetes, GI bleed, no thyroid disorder    Musculoskeletal (-) negative ROS    Abdominal  - normal exam   Substance History - negative use     OB/GYN negative ob/gyn ROS         Other - negative ROS                       Anesthesia Plan    ASA 2     MAC     intravenous induction     Anesthetic plan, all risks, benefits, and alternatives have been provided, discussed and informed consent has been obtained with: patient.    Plan discussed with CRNA and attending.

## 2020-05-08 NOTE — DISCHARGE INSTRUCTIONS
[facility]  Mohinder Valladares, Zafar Preciado Rachel, Scherrer, Thomas  [address]  [place] [address]  [phone]      Discharge Instructions for Venogram    1. Go home, rest and take it easy today.      2. You may experience some dizziness or memory loss from the anesthesia.  This may last for the next 24 hours.  Someone should plan on staying with you for the first 24 hours for your safety.    3. Do not make any important legal decisions or sign any legal papers for the next 24 hours.      4. Eat and drink lightly today.  Start off with liquids, jello, soup, crackers or other bland foods at first. You may advance your diet tomorrow as tolerated as long as you do not experience any nausea or vomiting.    5. You may resume routine medications including blood thinners. However, Glucophage should be not be started for 72 hours after the dye is given.      6. No lifting over 10 pounds and no strenuous activity for the next 2-3 days.    7. Try not to strain or bear down when your bowels move or when you empty your bladder.    8. Limit going up and down steps for 2 days.    9. No driving for the remainder of the day.  You may resume limited driving tomorrow if necessary.     10.  You may shower tomorrow.  No bath or hot tubs for at least 2 days after the procedure.          11. Leave the pressure dressing on until tomorrow morning.  You may then take this off, as well as the small see through dressing with gauze tomorrow.  If it doesn’t come off easily, do not pull this off.  If it is stuck, shower and let the warm water loosen it before removal.       12. Check your groin dressing regularly for bleeding through the dressing (under the pressure dressing).  A small amount of blood contained by the gauze is normal; the whole dressing should not be filled with blood or leaking out under the sides.     13. If you experience bleeding through the gauze/clear sticky dressing, lay flat and  have someone apply direct pressure for 15 minutes.  If bleeding has stopped after this, you may put a clean gauze and tape over the area.  Continue to lie flat for 1-2 hours.  If bleeding continues after 15 minutes of pressure, call us at the number listed above.  There is an MD available after hours.      14. If you experience heavy bleeding or large swelling, continue to hold firm pressure and              call 911.  Do not call the MD on call.     15.  If you experience pain or discomfort you may take Tylenol or Ibuprofen, whichever you normally use for minor discomfort, unless otherwise instructed.       16.  If the MD gives you a prescription for narcotic pain pills (Tylenol #3, Vicodin, Hydrocodone, Oxycodone or Percocet), you cannot drive a vehicle or operate machinery while taking these.    17.  Severe pain is not expected after this procedure.  If you experience severe pain, please call our office at [phone].    18.  Remember to contact our office for any of the following:    • Fever > 101 degrees  • Severe pain that cannot be controlled by taking your pain pills  • Severe nausea or vomiting   • Significant bleeding of your incisions  • Drainage that has a bad smell or is yellow or green in appearance  • Any other questions or concerns

## 2020-05-08 NOTE — OP NOTE
[facility]  Rad Baltazar  1985     Brief Operative Note    05/08/20   Dejuan White MD      Preoperative Diagnosis: Left subclavian vein stenosis    Postoperative Diagnosis: same as above    Procedure Performed: #1 ultrasound-guided access of the left basilic vein.  #2 left arm venogram with percutaneous angioplasty of the left subclavian vein    Surgeon: Dejuan White MD    Assistant: Alexa Gotti and they provided critical assistance during the case including suctioning, exposure, retraction, and reduction of blood loss.    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  He is brought for left subclavian venogram with possible angioplasty secondary to continued arm swelling despite thoracic outlet decompression    Angiographic findings: Weblike stenosis of the left subclavian vein, resolved after angioplasty with 8 mm x 6 cm Kaleva and 10 mm x 4 cm Kaleva balloons.    Description of procedure: The patient was taken to the operating room and placed in the supine position.  The left arm was prepped and draped in usual sterile fashion.  A timeout was observed.  Preoperative antibiotics were given.  Under ultrasound guidance and local anesthesia the left basilic vein was accessed and a micropuncture sheath was placed.  Venogram was performed to confirm placement.  This was upsized to a 5 Afghan sheath.  Using a Glidewire and a Berenstein catheter critical stenosis of the left subclavian vein was traversed.  The patient was administered 5000 units of intravenous heparin.  The sheath was upsized to a 6 Afghan sheath and an 8 mm x 6 cm Kaleva balloon followed by a 10 mm  x 4 cm Rutland balloon were used to sequentially dilate the critical subclavian vein stenosis which was seen as a web.  There was good resolution of the stenosis seen.  The inflations were held for 2 minutes followed by 1 minute.  Repeat venogram showed excellent drainage through the subclavian vein with decreased filling of collateral veins.  The sheath, wire, balloon were removed and pressure was held over the access site.  No complications.          Dejuan White MD  05/08/20

## 2020-05-08 NOTE — H&P
Name: Rad Baltazar ADMIT: 2020   : 1985  PCP: Dar Garcia MD    MRN: [id number] LOS: 0 days   AGE/SEX: 34 y.o. male  ROOM: [facility]         CHIEF COMPLAINT: Left arm swelling  HPI: Rad Baltazar is a 34 y.o. male whose previous medical records I have reviewed and summarize below in addition to the findings from today's exam.  He is status post left first rib resection with residual subclavian vein stenosis.  Still has symptomatic arm swelling and he is brought today for angiogram and possible angioplasty    PAST MEDICAL HISTORY:   Past Medical History:   Diagnosis Date   • Anxiety    • Arm vein blood clot, left       PAST SURGICAL HISTORY:   Past Surgical History:   Procedure Laterality Date   • FIRST RIB RESECTION Left 2020    Procedure: LEFT FIRST RIB RESECTION;  Surgeon: Dejuan White MD;  Location: [facility];  Service: Vascular;  Laterality: Left;   • VENOGRAM PERIPHERAL Left 2019    Procedure: LEFT ARM VENOGRAM AND THROMOLYSIS;  Surgeon: Dejuan White MD;  Location: [facility] ;  Service: Vascular   • WISDOM TOOTH EXTRACTION        FAMILY HISTORY:   Family History   Problem Relation Age of Onset   • Malig Hyperthermia Neg Hx       SOCIAL HISTORY:   Social History     Tobacco Use   • Smoking status: Former Smoker     Packs/day: 0.50     Types: Cigarettes     Last attempt to quit: 2020     Years since quittin.3   • Smokeless tobacco: Never Used   Substance Use Topics   • Alcohol use: Never     Frequency: Never   • Drug use: Never      MEDICATIONS:   No current facility-administered medications on file prior to encounter.      Current Outpatient Medications on File Prior to Encounter   Medication Sig Dispense Refill   • apixaban (ELIQUIS) 5 MG tablet tablet Take 5 mg by mouth 2 (Two) Times a Day. TO HOLD 1 DAYS PRIOR TO SURGERY PER DR. WHITE'S INSTRUCTIONS     • cyclobenzaprine (FLEXERIL) 10 MG tablet  "Take 1 tablet by mouth 3 (Three) Times a Day. (Patient taking differently: Take 10 mg by mouth 2 (Two) Times a Day As Needed.) 50 tablet 0   • melatonin 5 MG tablet tablet Take 7.5 mg by mouth At Night As Needed.     • ibuprofen (ADVIL,MOTRIN) 800 MG tablet Take 1 tablet by mouth Every 8 (Eight) Hours. (Patient taking differently: Take 800 mg by mouth Every 8 (Eight) Hours. PT HOLDING FOR SURGERY) 50 tablet 0   • Multiple Vitamins-Minerals (MULTIVITAMIN ADULT PO) Take 1 tablet by mouth Daily. HOLD FOR SURGERY     • propranolol (INDERAL) 10 MG tablet Take 10 mg by mouth Daily As Needed. TAKES FOR ANXIETY     • traZODone (DESYREL) 50 MG tablet Take 50 mg by mouth At Night As Needed.               ALLERGIES: Dilaudid [hydromorphone hcl]     COMPLETE REVIEW OF SYSTEMS:     Review of Systems   Constitutional: Positive for activity change and fatigue.   Respiratory: Negative for shortness of breath.    Cardiovascular: Negative for chest pain.   Gastrointestinal: Negative for abdominal pain.     Left arm swelling    PHYSICAL EXAM:   Patient Vitals for the past 24 hrs:   BP Temp Temp src Pulse Resp SpO2 Height Weight   05/08/20 0842 -- -- -- 80 16 97 % -- --   05/08/20 0742 124/75 98.3 °F (36.8 °C) Oral 75 16 98 % 193 cm (76\") 81.5 kg (179 lb 10.8 oz)        Physical Exam   Constitutional: He appears well-developed. No distress.   HENT:   Head: Normocephalic and atraumatic.   Cardiovascular: Normal rate and regular rhythm.   Pulmonary/Chest: Effort normal. No respiratory distress.   Abdominal: Soft. There is no tenderness.   Neurological: He is alert.   Skin: Skin is warm and dry.     Mild swelling of the left arm.  First rib removal incisions are clean, dry, intact.        LABS:      Recent Results (from the past 24 hour(s))   SARS-CoV-2 PCR ([place] IN-HOUSE PERFORMED), NP SWAB IN TRANSPORT MEDIA - Swab, Nasopharynx    Collection Time: 05/07/20  8:56 AM   Result Value Ref Range    COVID19 Not Detected Not Detected - " Ref. Range   ]    The following radiologic or non-invasive studies including the images have been independently reviewed by me and my impressions are as follows: Prior venogram shows severe subclavian stenosis       ASSESSMENT/PLAN: 34 y.o. male with residual subclavian vein stenosis after first rib resection.  He has been treated with Eliquis in the interim.  Plan for venogram with angioplasty today.  Discussed with him that will attempt to avoid stent placement in such a young patient.    I have discussed with the patient the following five points:  1-  I have been asked to see Rad Baltazar for the treatment of the diagnosis of: Left arm swelling from subclavian vein stenosis  2- The planned treatment of this diagnosis is: Venogram with angioplasty with possible stent placement  3- The risks of a procedure include but are not limited to the following: bleeding, thrombosis, damage to adjacent anatomical structures including nerves or blood vessels, infections, wound healing problems, the need for further procedures, whether planned or emergent. The benefits of a procedure include but are not limited to the following: Decrease in left arm swelling and decrease risk of future DVT  4- Alternatives to the planned procedure include: Lifelong anticoagulation  5- The natural history of the diagnosis if no treatment is given is: Increased likelihood of repeat DVT    Assessment/Plan       * No active hospital problems. *      Dejuan White MD   05/08/20

## 2020-05-08 NOTE — ANESTHESIA POSTPROCEDURE EVALUATION
"Patient: Rad Baltazar    Procedure Summary     Date:  05/08/20 Room / Location:   [facility]    Anesthesia Start:  0903 Anesthesia Stop:  1002    Procedure:  LEFT ARM VENOGRAM WITH ANGIOPLASTY LEFT SUBCLAVIAN VEIN (Left ) Diagnosis:      Surgeon:  Dejuan White MD Provider:  Martinez Tillman MD    Anesthesia Type:  MAC ASA Status:  2          Anesthesia Type: MAC    Vitals  No vitals data found for the desired time range.          Post Anesthesia Care and Evaluation    Patient location during evaluation: bedside  Patient participation: complete - patient participated  Level of consciousness: awake and alert  Pain score: 0  Pain management: adequate  Airway patency: patent  Anesthetic complications: No anesthetic complications    Cardiovascular status: acceptable  Respiratory status: acceptable  Hydration status: acceptable    Comments: /75 (BP Location: Right arm, Patient Position: Lying)   Pulse 80   Temp 36.8 °C (98.3 °F) (Oral)   Resp 16   Ht 193 cm (76\")   Wt 81.5 kg (179 lb 10.8 oz)   SpO2 97%   BMI 21.87 kg/m²       "

## 2021-04-16 ENCOUNTER — BULK ORDERING (OUTPATIENT)
Dept: CASE MANAGEMENT | Facility: OTHER | Age: 36
End: 2021-04-16

## 2021-04-16 DIAGNOSIS — Z23 IMMUNIZATION DUE: ICD-10-CM

## 2021-07-19 ENCOUNTER — TELEPHONE (OUTPATIENT)
Dept: SURGERY | Facility: CLINIC | Age: 36
End: 2021-07-19

## 2021-07-19 NOTE — TELEPHONE ENCOUNTER
Patient was called and notified of new patient appointment and address of building. Patient confirmed.

## 2022-09-14 NOTE — ANESTHESIA PREPROCEDURE EVALUATION
Anesthesia Evaluation     no history of anesthetic complications:               Airway   Mallampati: II  Neck ROM: full  no difficulty expected  Dental - normal exam     Pulmonary - normal exam   (+) a smoker Current Abstained day of surgery,   (-) COPD, asthma, sleep apnea    PE comment: nonlabored  Cardiovascular - normal exam    Rhythm: regular  Rate: normal    (+) DVT (L UE) current,   (-) hypertension, valvular problems/murmurs, past MI, CAD, dysrhythmias, angina    ROS comment: Thoracic Outlet syndrome    Neuro/Psych- negative ROS  (-) seizures, TIA, CVA  GI/Hepatic/Renal/Endo - negative ROS   (-) GERD, liver disease, no renal disease, diabetes, no thyroid disorder    Musculoskeletal (-) negative ROS    Abdominal    Substance History      OB/GYN          Other                        Anesthesia Plan    ASA 2     MAC       Anesthetic plan, all risks, benefits, and alternatives have been provided, discussed and informed consent has been obtained with: patient.       No restrictions

## (undated) DEVICE — EQUIPMENT COVER BAG TYPE 48” X 36” (122CM X 91CM): Brand: EQUIPMENT COVER BAG TYPE

## (undated) DEVICE — PINNACLE R/O II INTRODUCER SHEATH WITH RADIOPAQUE MARKER: Brand: PINNACLE

## (undated) DEVICE — CONTN STRL 32OZ

## (undated) DEVICE — GLV SURG BIOGEL M LTX PF 7 1/2

## (undated) DEVICE — CATH GUIDE SOFTVU FLUSH HT STR .035 5F 65CM

## (undated) DEVICE — ANTIBACTERIAL UNDYED BRAIDED (POLYGLACTIN 910), SYNTHETIC ABSORBABLE SUTURE: Brand: COATED VICRYL

## (undated) DEVICE — PK ANGIO 40

## (undated) DEVICE — CATH TEMPO 5F BER II 65CM: Brand: TEMPO

## (undated) DEVICE — INFLATION DEVICE: Brand: ENCORE™ 26

## (undated) DEVICE — PTA BALLOON DILATATION CATHETER: Brand: MUSTANG™

## (undated) DEVICE — RADIFOCUS TORQUE DEVICE MULTI-TORQUE VISE: Brand: RADIFOCUS TORQUE DEVICE

## (undated) DEVICE — THROMBECTOMY SET: Brand: ANGIOJET™ ZELANTEDVT™

## (undated) DEVICE — ST ACC MICROPUNCTURE STFF .018 ECHO/PLDM/TP 4F/10CM 21G/7CM

## (undated) DEVICE — RADIFOCUS GLIDEWIRE: Brand: GLIDEWIRE

## (undated) DEVICE — SUT SILK 3/0 TIES 18IN A184H

## (undated) DEVICE — SYRINGE KIT,PACKAGED,,150FT,MK 7(ANGIO-ARTERION, 150ML SYR KIT W/QFT,MC)(60729385): Brand: MEDRAD® MARK 7 ARTERION DISPOSABLE SYRINGE 150 ML WITH QUICK FILL TUBE

## (undated) DEVICE — KT PWR PULS ANGIOJET XPEEDIOR 120

## (undated) DEVICE — JACKSON-PRATT 100CC BULB RESERVOIR: Brand: CARDINAL HEALTH

## (undated) DEVICE — CVR PROB 96IN LF STRL

## (undated) DEVICE — ADHS SKIN DERMABOND TOP ADVANCED

## (undated) DEVICE — SUT VIC 2/0 CT1 36IN

## (undated) DEVICE — GLV SURG PREMIERPRO ORTHO LTX PF SZ7.5 BRN

## (undated) DEVICE — SYR LL TP 10ML STRL

## (undated) DEVICE — DRN JP RND NO TROC SIL 10F 1/8IN

## (undated) DEVICE — PINNACLE INTRODUCER SHEATH: Brand: PINNACLE

## (undated) DEVICE — PK ENDART CARTOID 40

## (undated) DEVICE — SUT SILK 2/0 TIES 18IN A185H

## (undated) DEVICE — SHLD ANGIO 2LAYR CIR FEN

## (undated) DEVICE — NDL PERC 1PRT THNWALL W/BASEPLT 18G 7CM

## (undated) DEVICE — PK ATS CUST W CARDIOTOMY RESEVOIR

## (undated) DEVICE — POOLE SUCTION HANDLE: Brand: CARDINAL HEALTH

## (undated) DEVICE — ANGIOGRAPHIC CATHETER: Brand: IMAGER™ II

## (undated) DEVICE — Device

## (undated) DEVICE — Device: Brand: D-STAT® DRY SILVER CLEAR TOPICAL HEMOSTAT

## (undated) DEVICE — SUT SILK 4/0 TIES 18IN A183H

## (undated) DEVICE — SOL NACL 0.9PCT 1000ML

## (undated) DEVICE — SUT SILK 3/0 SH CR5 18IN C0135